# Patient Record
Sex: MALE | Race: WHITE | Employment: FULL TIME | URBAN - METROPOLITAN AREA
[De-identification: names, ages, dates, MRNs, and addresses within clinical notes are randomized per-mention and may not be internally consistent; named-entity substitution may affect disease eponyms.]

---

## 2017-05-04 ENCOUNTER — HOSPITAL ENCOUNTER (INPATIENT)
Age: 62
LOS: 1 days | Discharge: HOME | DRG: 103 | End: 2017-05-05
Attending: EMERGENCY MEDICINE | Admitting: FAMILY MEDICINE
Payer: COMMERCIAL

## 2017-05-04 ENCOUNTER — APPOINTMENT (OUTPATIENT)
Dept: CT IMAGING | Age: 62
DRG: 103 | End: 2017-05-04
Payer: COMMERCIAL

## 2017-05-04 ENCOUNTER — APPOINTMENT (OUTPATIENT)
Dept: MRI IMAGING | Age: 62
DRG: 103 | End: 2017-05-04
Payer: COMMERCIAL

## 2017-05-04 DIAGNOSIS — I63.9 CEREBROVASCULAR ACCIDENT (CVA), UNSPECIFIED MECHANISM (HCC): Primary | ICD-10-CM

## 2017-05-04 PROBLEM — R51.9 ACUTE HEADACHE: Status: ACTIVE | Noted: 2017-05-04

## 2017-05-04 PROBLEM — E78.5 DYSLIPIDEMIA: Status: ACTIVE | Noted: 2017-05-04

## 2017-05-04 PROBLEM — G43.809 MIGRAINE VARIANT: Status: ACTIVE | Noted: 2017-05-04

## 2017-05-04 PROBLEM — E78.1 HYPERTRIGLYCERIDEMIA: Status: ACTIVE | Noted: 2017-05-04

## 2017-05-04 PROBLEM — K21.9 GERD (GASTROESOPHAGEAL REFLUX DISEASE): Chronic | Status: ACTIVE | Noted: 2017-05-04

## 2017-05-04 PROBLEM — R20.0 LEFT SIDED NUMBNESS: Status: ACTIVE | Noted: 2017-05-04

## 2017-05-04 LAB
% CKMB: 3.5 % (ref 0–3.5)
ABSOLUTE EOS #: 0.2 K/UL (ref 0–0.4)
ABSOLUTE LYMPH #: 2.4 K/UL (ref 1–4.8)
ABSOLUTE MONO #: 0.5 K/UL (ref 0.1–1.2)
ANION GAP SERPL CALCULATED.3IONS-SCNC: 15 MMOL/L (ref 9–17)
BASOPHILS # BLD: 1 %
BASOPHILS ABSOLUTE: 0.1 K/UL (ref 0–0.2)
BUN BLDV-MCNC: 23 MG/DL (ref 8–23)
BUN/CREAT BLD: ABNORMAL (ref 9–20)
C-REACTIVE PROTEIN: 6.1 MG/L (ref 0–5)
CALCIUM SERPL-MCNC: 9.6 MG/DL (ref 8.6–10.4)
CHLORIDE BLD-SCNC: 95 MMOL/L (ref 98–107)
CK MB: 2.4 NG/ML
CKMB INTERPRETATION: ABNORMAL
CO2: 25 MMOL/L (ref 20–31)
CREAT SERPL-MCNC: 0.67 MG/DL (ref 0.7–1.2)
DIFFERENTIAL TYPE: ABNORMAL
EOSINOPHILS RELATIVE PERCENT: 3 %
ESTIMATED AVERAGE GLUCOSE: 203 MG/DL
GFR AFRICAN AMERICAN: >60 ML/MIN
GFR NON-AFRICAN AMERICAN: >60 ML/MIN
GFR SERPL CREATININE-BSD FRML MDRD: ABNORMAL ML/MIN/{1.73_M2}
GFR SERPL CREATININE-BSD FRML MDRD: ABNORMAL ML/MIN/{1.73_M2}
GLUCOSE BLD-MCNC: 131 MG/DL (ref 75–110)
GLUCOSE BLD-MCNC: 175 MG/DL (ref 75–110)
GLUCOSE BLD-MCNC: 179 MG/DL (ref 70–99)
GLUCOSE BLD-MCNC: 180 MG/DL (ref 75–110)
HBA1C MFR BLD: 8.7 % (ref 4–6)
HCT VFR BLD CALC: 44 % (ref 41–53)
HEMOGLOBIN: 14.8 G/DL (ref 13.5–17.5)
INR BLD: 0.9
LV EF: 50 %
LVEF MODALITY: NORMAL
LYMPHOCYTES # BLD: 35 %
MCH RBC QN AUTO: 26.8 PG (ref 26–34)
MCHC RBC AUTO-ENTMCNC: 33.5 G/DL (ref 31–37)
MCV RBC AUTO: 80 FL (ref 80–100)
MONOCYTES # BLD: 8 %
MYOGLOBIN: 24 NG/ML (ref 28–72)
PARTIAL THROMBOPLASTIN TIME: 23.7 SEC (ref 21.3–31.3)
PDW BLD-RTO: 16.4 % (ref 12.5–15.4)
PLATELET # BLD: 302 K/UL (ref 140–450)
PLATELET ESTIMATE: ABNORMAL
PMV BLD AUTO: 8 FL (ref 6–12)
POC CREATININE WHOLE BLOOD: 0.8
POC CREATININE: 0.8 MG/DL (ref 0.6–1.4)
POTASSIUM SERPL-SCNC: 4.9 MMOL/L (ref 3.7–5.3)
PROTHROMBIN TIME: 10.1 SEC (ref 9.4–12.6)
RBC # BLD: 5.51 M/UL (ref 4.5–5.9)
RBC # BLD: ABNORMAL 10*6/UL
SEDIMENTATION RATE, ERYTHROCYTE: 3 MM (ref 0–10)
SEG NEUTROPHILS: 53 %
SEGMENTED NEUTROPHILS ABSOLUTE COUNT: 3.7 K/UL (ref 1.8–7.7)
SODIUM BLD-SCNC: 135 MMOL/L (ref 135–144)
TOTAL CK: 68 U/L (ref 39–308)
TROPONIN INTERP: ABNORMAL
TROPONIN T: <0.03 NG/ML
WBC # BLD: 7 K/UL (ref 3.5–11)
WBC # BLD: ABNORMAL 10*3/UL

## 2017-05-04 PROCEDURE — G8989 SELF CARE D/C STATUS: HCPCS

## 2017-05-04 PROCEDURE — 82565 ASSAY OF CREATININE: CPT

## 2017-05-04 PROCEDURE — 70551 MRI BRAIN STEM W/O DYE: CPT

## 2017-05-04 PROCEDURE — 85025 COMPLETE CBC W/AUTO DIFF WBC: CPT

## 2017-05-04 PROCEDURE — 83874 ASSAY OF MYOGLOBIN: CPT

## 2017-05-04 PROCEDURE — G8987 SELF CARE CURRENT STATUS: HCPCS

## 2017-05-04 PROCEDURE — 82550 ASSAY OF CK (CPK): CPT

## 2017-05-04 PROCEDURE — 97161 PT EVAL LOW COMPLEX 20 MIN: CPT

## 2017-05-04 PROCEDURE — 94762 N-INVAS EAR/PLS OXIMTRY CONT: CPT

## 2017-05-04 PROCEDURE — 99254 IP/OBS CNSLTJ NEW/EST MOD 60: CPT | Performed by: PSYCHIATRY & NEUROLOGY

## 2017-05-04 PROCEDURE — 82947 ASSAY GLUCOSE BLOOD QUANT: CPT

## 2017-05-04 PROCEDURE — 93880 EXTRACRANIAL BILAT STUDY: CPT

## 2017-05-04 PROCEDURE — G8988 SELF CARE GOAL STATUS: HCPCS

## 2017-05-04 PROCEDURE — 84484 ASSAY OF TROPONIN QUANT: CPT

## 2017-05-04 PROCEDURE — 86140 C-REACTIVE PROTEIN: CPT

## 2017-05-04 PROCEDURE — 2060000000 HC ICU INTERMEDIATE R&B

## 2017-05-04 PROCEDURE — 83036 HEMOGLOBIN GLYCOSYLATED A1C: CPT

## 2017-05-04 PROCEDURE — 82553 CREATINE MB FRACTION: CPT

## 2017-05-04 PROCEDURE — 70544 MR ANGIOGRAPHY HEAD W/O DYE: CPT

## 2017-05-04 PROCEDURE — 70547 MR ANGIOGRAPHY NECK W/O DYE: CPT

## 2017-05-04 PROCEDURE — G8978 MOBILITY CURRENT STATUS: HCPCS

## 2017-05-04 PROCEDURE — 85610 PROTHROMBIN TIME: CPT

## 2017-05-04 PROCEDURE — 99223 1ST HOSP IP/OBS HIGH 75: CPT | Performed by: PSYCHIATRY & NEUROLOGY

## 2017-05-04 PROCEDURE — 97165 OT EVAL LOW COMPLEX 30 MIN: CPT

## 2017-05-04 PROCEDURE — 99223 1ST HOSP IP/OBS HIGH 75: CPT | Performed by: FAMILY MEDICINE

## 2017-05-04 PROCEDURE — 93306 TTE W/DOPPLER COMPLETE: CPT

## 2017-05-04 PROCEDURE — 85730 THROMBOPLASTIN TIME PARTIAL: CPT

## 2017-05-04 PROCEDURE — 97535 SELF CARE MNGMENT TRAINING: CPT

## 2017-05-04 PROCEDURE — 80048 BASIC METABOLIC PNL TOTAL CA: CPT

## 2017-05-04 PROCEDURE — 95819 EEG AWAKE AND ASLEEP: CPT

## 2017-05-04 PROCEDURE — 85651 RBC SED RATE NONAUTOMATED: CPT

## 2017-05-04 PROCEDURE — 2580000003 HC RX 258: Performed by: EMERGENCY MEDICINE

## 2017-05-04 PROCEDURE — 2580000003 HC RX 258: Performed by: NURSE PRACTITIONER

## 2017-05-04 PROCEDURE — 6360000002 HC RX W HCPCS: Performed by: NURSE PRACTITIONER

## 2017-05-04 PROCEDURE — G8979 MOBILITY GOAL STATUS: HCPCS

## 2017-05-04 PROCEDURE — 99285 EMERGENCY DEPT VISIT HI MDM: CPT

## 2017-05-04 RX ORDER — FENOFIBRATE 54 MG/1
54 TABLET ORAL DAILY
Status: DISCONTINUED | OUTPATIENT
Start: 2017-05-04 | End: 2017-05-05 | Stop reason: HOSPADM

## 2017-05-04 RX ORDER — BUTALBITAL, ACETAMINOPHEN AND CAFFEINE 50; 325; 40 MG/1; MG/1; MG/1
1 TABLET ORAL EVERY 6 HOURS PRN
Status: DISCONTINUED | OUTPATIENT
Start: 2017-05-04 | End: 2017-05-05 | Stop reason: HOSPADM

## 2017-05-04 RX ORDER — GLIMEPIRIDE 2 MG/1
4 TABLET ORAL 2 TIMES DAILY
Status: DISCONTINUED | OUTPATIENT
Start: 2017-05-04 | End: 2017-05-04

## 2017-05-04 RX ORDER — HYDROCHLOROTHIAZIDE 25 MG/1
12.5 TABLET ORAL DAILY
Status: DISCONTINUED | OUTPATIENT
Start: 2017-05-04 | End: 2017-05-05 | Stop reason: HOSPADM

## 2017-05-04 RX ORDER — NICOTINE POLACRILEX 4 MG
15 LOZENGE BUCCAL PRN
Status: DISCONTINUED | OUTPATIENT
Start: 2017-05-04 | End: 2017-05-05 | Stop reason: HOSPADM

## 2017-05-04 RX ORDER — SODIUM CHLORIDE 0.9 % (FLUSH) 0.9 %
10 SYRINGE (ML) INJECTION EVERY 12 HOURS SCHEDULED
Status: DISCONTINUED | OUTPATIENT
Start: 2017-05-04 | End: 2017-05-05 | Stop reason: HOSPADM

## 2017-05-04 RX ORDER — TRIMETHOPRIM 100 MG/1
100 TABLET ORAL DAILY
Status: DISCONTINUED | OUTPATIENT
Start: 2017-05-04 | End: 2017-05-04

## 2017-05-04 RX ORDER — GLIMEPIRIDE 2 MG/1
4 TABLET ORAL
Status: DISCONTINUED | OUTPATIENT
Start: 2017-05-05 | End: 2017-05-05 | Stop reason: HOSPADM

## 2017-05-04 RX ORDER — ACETAMINOPHEN 325 MG/1
650 TABLET ORAL EVERY 4 HOURS PRN
Status: DISCONTINUED | OUTPATIENT
Start: 2017-05-04 | End: 2017-05-05 | Stop reason: HOSPADM

## 2017-05-04 RX ORDER — HYDROCHLOROTHIAZIDE 25 MG/1
12.5 TABLET ORAL 2 TIMES DAILY
Status: DISCONTINUED | OUTPATIENT
Start: 2017-05-04 | End: 2017-05-04

## 2017-05-04 RX ORDER — 0.9 % SODIUM CHLORIDE 0.9 %
1000 INTRAVENOUS SOLUTION INTRAVENOUS ONCE
Status: COMPLETED | OUTPATIENT
Start: 2017-05-04 | End: 2017-05-04

## 2017-05-04 RX ORDER — MORPHINE SULFATE 2 MG/ML
2 INJECTION, SOLUTION INTRAMUSCULAR; INTRAVENOUS
Status: DISCONTINUED | OUTPATIENT
Start: 2017-05-04 | End: 2017-05-05 | Stop reason: HOSPADM

## 2017-05-04 RX ORDER — LISINOPRIL AND HYDROCHLOROTHIAZIDE 20; 12.5 MG/1; MG/1
1 TABLET ORAL DAILY
COMMUNITY
End: 2017-06-26 | Stop reason: SDUPTHER

## 2017-05-04 RX ORDER — HYDROCODONE BITARTRATE AND ACETAMINOPHEN 5; 325 MG/1; MG/1
1 TABLET ORAL EVERY 4 HOURS PRN
Status: DISCONTINUED | OUTPATIENT
Start: 2017-05-04 | End: 2017-05-05 | Stop reason: HOSPADM

## 2017-05-04 RX ORDER — M-VIT,TX,IRON,MINS/CALC/FOLIC 27MG-0.4MG
1 TABLET ORAL DAILY
Status: DISCONTINUED | OUTPATIENT
Start: 2017-05-04 | End: 2017-05-05 | Stop reason: HOSPADM

## 2017-05-04 RX ORDER — LISINOPRIL 20 MG/1
20 TABLET ORAL DAILY
Status: DISCONTINUED | OUTPATIENT
Start: 2017-05-04 | End: 2017-05-05 | Stop reason: HOSPADM

## 2017-05-04 RX ORDER — MORPHINE SULFATE 4 MG/ML
4 INJECTION, SOLUTION INTRAMUSCULAR; INTRAVENOUS
Status: DISCONTINUED | OUTPATIENT
Start: 2017-05-04 | End: 2017-05-05 | Stop reason: HOSPADM

## 2017-05-04 RX ORDER — HYDROCHLOROTHIAZIDE 25 MG/1
25 TABLET ORAL 2 TIMES DAILY
Status: DISCONTINUED | OUTPATIENT
Start: 2017-05-04 | End: 2017-05-04

## 2017-05-04 RX ORDER — ATORVASTATIN CALCIUM 80 MG/1
80 TABLET, FILM COATED ORAL DAILY
Status: DISCONTINUED | OUTPATIENT
Start: 2017-05-04 | End: 2017-05-05 | Stop reason: HOSPADM

## 2017-05-04 RX ORDER — PANTOPRAZOLE SODIUM 40 MG/1
40 TABLET, DELAYED RELEASE ORAL
Status: DISCONTINUED | OUTPATIENT
Start: 2017-05-05 | End: 2017-05-05 | Stop reason: HOSPADM

## 2017-05-04 RX ORDER — LISINOPRIL AND HYDROCHLOROTHIAZIDE 25; 20 MG/1; MG/1
1 TABLET ORAL 2 TIMES DAILY
Status: DISCONTINUED | OUTPATIENT
Start: 2017-05-04 | End: 2017-05-04

## 2017-05-04 RX ORDER — LISINOPRIL 20 MG/1
20 TABLET ORAL 2 TIMES DAILY
Status: DISCONTINUED | OUTPATIENT
Start: 2017-05-04 | End: 2017-05-04

## 2017-05-04 RX ORDER — BISACODYL 10 MG
10 SUPPOSITORY, RECTAL RECTAL DAILY PRN
Status: DISCONTINUED | OUTPATIENT
Start: 2017-05-04 | End: 2017-05-05 | Stop reason: HOSPADM

## 2017-05-04 RX ORDER — CLOPIDOGREL BISULFATE 75 MG/1
75 TABLET ORAL DAILY
Status: DISCONTINUED | OUTPATIENT
Start: 2017-05-04 | End: 2017-05-04

## 2017-05-04 RX ORDER — SODIUM CHLORIDE 0.9 % (FLUSH) 0.9 %
10 SYRINGE (ML) INJECTION PRN
Status: DISCONTINUED | OUTPATIENT
Start: 2017-05-04 | End: 2017-05-05 | Stop reason: HOSPADM

## 2017-05-04 RX ORDER — ASPIRIN 81 MG/1
81 TABLET ORAL DAILY
Status: DISCONTINUED | OUTPATIENT
Start: 2017-05-04 | End: 2017-05-05 | Stop reason: HOSPADM

## 2017-05-04 RX ORDER — TAMSULOSIN HYDROCHLORIDE 0.4 MG/1
0.4 CAPSULE ORAL DAILY
Status: DISCONTINUED | OUTPATIENT
Start: 2017-05-04 | End: 2017-05-04

## 2017-05-04 RX ORDER — ONDANSETRON 2 MG/ML
4 INJECTION INTRAMUSCULAR; INTRAVENOUS EVERY 6 HOURS PRN
Status: DISCONTINUED | OUTPATIENT
Start: 2017-05-04 | End: 2017-05-05 | Stop reason: HOSPADM

## 2017-05-04 RX ORDER — DEXTROSE MONOHYDRATE 25 G/50ML
12.5 INJECTION, SOLUTION INTRAVENOUS PRN
Status: DISCONTINUED | OUTPATIENT
Start: 2017-05-04 | End: 2017-05-05 | Stop reason: HOSPADM

## 2017-05-04 RX ORDER — DEXTROSE MONOHYDRATE 50 MG/ML
100 INJECTION, SOLUTION INTRAVENOUS PRN
Status: DISCONTINUED | OUTPATIENT
Start: 2017-05-04 | End: 2017-05-05 | Stop reason: HOSPADM

## 2017-05-04 RX ORDER — SODIUM CHLORIDE 9 MG/ML
INJECTION, SOLUTION INTRAVENOUS CONTINUOUS
Status: DISCONTINUED | OUTPATIENT
Start: 2017-05-04 | End: 2017-05-04

## 2017-05-04 RX ADMIN — Medication 1 TABLET: at 14:17

## 2017-05-04 RX ADMIN — LISINOPRIL 20 MG: 20 TABLET ORAL at 14:15

## 2017-05-04 RX ADMIN — SODIUM CHLORIDE, PRESERVATIVE FREE 10 ML: 5 INJECTION INTRAVENOUS at 19:58

## 2017-05-04 RX ADMIN — ATORVASTATIN CALCIUM 80 MG: 80 TABLET, FILM COATED ORAL at 14:18

## 2017-05-04 RX ADMIN — SODIUM CHLORIDE 1000 ML: 9 INJECTION, SOLUTION INTRAVENOUS at 05:48

## 2017-05-04 RX ADMIN — SODIUM CHLORIDE: 9 INJECTION, SOLUTION INTRAVENOUS at 06:41

## 2017-05-04 RX ADMIN — ENOXAPARIN SODIUM 40 MG: 40 INJECTION SUBCUTANEOUS at 14:17

## 2017-05-04 RX ADMIN — HYDROCHLOROTHIAZIDE 12.5 MG: 25 TABLET ORAL at 14:15

## 2017-05-04 RX ADMIN — ASPIRIN 81 MG: 81 TABLET ORAL at 14:19

## 2017-05-04 ASSESSMENT — ENCOUNTER SYMPTOMS
SORE THROAT: 0
SINUS PRESSURE: 0
SHORTNESS OF BREATH: 0
EYE DISCHARGE: 0
STRIDOR: 0
EYE REDNESS: 0
CHEST TIGHTNESS: 0
NAUSEA: 0
ABDOMINAL PAIN: 0
COUGH: 0
CONSTIPATION: 0
VOMITING: 0
EYE PAIN: 0
BLOOD IN STOOL: 0
WHEEZING: 0
DIARRHEA: 0
RHINORRHEA: 0
RECTAL PAIN: 0
BACK PAIN: 0

## 2017-05-04 ASSESSMENT — PAIN SCALES - GENERAL
PAINLEVEL_OUTOF10: 0
PAINLEVEL_OUTOF10: 0

## 2017-05-05 VITALS
TEMPERATURE: 96.9 F | HEIGHT: 72 IN | DIASTOLIC BLOOD PRESSURE: 73 MMHG | HEART RATE: 78 BPM | RESPIRATION RATE: 16 BRPM | BODY MASS INDEX: 24.79 KG/M2 | WEIGHT: 183 LBS | SYSTOLIC BLOOD PRESSURE: 130 MMHG | OXYGEN SATURATION: 97 %

## 2017-05-05 PROBLEM — I63.9 STROKE DETERMINED BY CLINICAL ASSESSMENT (HCC): Status: RESOLVED | Noted: 2017-05-04 | Resolved: 2017-05-05

## 2017-05-05 LAB
ALBUMIN SERPL-MCNC: 4.3 G/DL (ref 3.5–5.2)
ALBUMIN/GLOBULIN RATIO: 1.2 (ref 1–2.5)
ALP BLD-CCNC: 61 U/L (ref 40–129)
ALT SERPL-CCNC: 17 U/L (ref 5–41)
ANION GAP SERPL CALCULATED.3IONS-SCNC: 17 MMOL/L (ref 9–17)
AST SERPL-CCNC: 12 U/L
BILIRUB SERPL-MCNC: 0.56 MG/DL (ref 0.3–1.2)
BUN BLDV-MCNC: 28 MG/DL (ref 8–23)
BUN/CREAT BLD: ABNORMAL (ref 9–20)
CALCIUM SERPL-MCNC: 10 MG/DL (ref 8.6–10.4)
CHLORIDE BLD-SCNC: 98 MMOL/L (ref 98–107)
CHOLESTEROL/HDL RATIO: 8.7
CHOLESTEROL: 339 MG/DL
CO2: 21 MMOL/L (ref 20–31)
CREAT SERPL-MCNC: 0.7 MG/DL (ref 0.7–1.2)
ESTIMATED AVERAGE GLUCOSE: 206 MG/DL
GFR AFRICAN AMERICAN: >60 ML/MIN
GFR NON-AFRICAN AMERICAN: >60 ML/MIN
GFR SERPL CREATININE-BSD FRML MDRD: ABNORMAL ML/MIN/{1.73_M2}
GFR SERPL CREATININE-BSD FRML MDRD: ABNORMAL ML/MIN/{1.73_M2}
GLUCOSE BLD-MCNC: 211 MG/DL (ref 70–99)
HBA1C MFR BLD: 8.8 % (ref 4–6)
HCT VFR BLD CALC: 48 % (ref 41–53)
HDLC SERPL-MCNC: 39 MG/DL
HEMOGLOBIN: 15.9 G/DL (ref 13.5–17.5)
LDL CHOLESTEROL DIRECT: 206 MG/DL
LDL CHOLESTEROL: ABNORMAL MG/DL (ref 0–130)
MCH RBC QN AUTO: 26.6 PG (ref 26–34)
MCHC RBC AUTO-ENTMCNC: 33.1 G/DL (ref 31–37)
MCV RBC AUTO: 80.4 FL (ref 80–100)
PDW BLD-RTO: 15.9 % (ref 12.5–15.4)
PLATELET # BLD: 317 K/UL (ref 140–450)
PMV BLD AUTO: 8.4 FL (ref 6–12)
POTASSIUM SERPL-SCNC: 4.4 MMOL/L (ref 3.7–5.3)
RBC # BLD: 5.97 M/UL (ref 4.5–5.9)
SODIUM BLD-SCNC: 136 MMOL/L (ref 135–144)
TOTAL PROTEIN: 7.8 G/DL (ref 6.4–8.3)
TRIGL SERPL-MCNC: 761 MG/DL
VLDLC SERPL CALC-MCNC: ABNORMAL MG/DL (ref 1–30)
WBC # BLD: 9.4 K/UL (ref 3.5–11)

## 2017-05-05 PROCEDURE — 83721 ASSAY OF BLOOD LIPOPROTEIN: CPT

## 2017-05-05 PROCEDURE — 6370000000 HC RX 637 (ALT 250 FOR IP): Performed by: NURSE PRACTITIONER

## 2017-05-05 PROCEDURE — 80053 COMPREHEN METABOLIC PANEL: CPT

## 2017-05-05 PROCEDURE — 99233 SBSQ HOSP IP/OBS HIGH 50: CPT | Performed by: FAMILY MEDICINE

## 2017-05-05 PROCEDURE — 97116 GAIT TRAINING THERAPY: CPT

## 2017-05-05 PROCEDURE — 80061 LIPID PANEL: CPT

## 2017-05-05 PROCEDURE — 97110 THERAPEUTIC EXERCISES: CPT

## 2017-05-05 PROCEDURE — 99232 SBSQ HOSP IP/OBS MODERATE 35: CPT | Performed by: PSYCHIATRY & NEUROLOGY

## 2017-05-05 PROCEDURE — 83036 HEMOGLOBIN GLYCOSYLATED A1C: CPT

## 2017-05-05 PROCEDURE — 85027 COMPLETE CBC AUTOMATED: CPT

## 2017-05-05 PROCEDURE — 36415 COLL VENOUS BLD VENIPUNCTURE: CPT

## 2017-05-05 PROCEDURE — 97530 THERAPEUTIC ACTIVITIES: CPT

## 2017-05-05 PROCEDURE — 94762 N-INVAS EAR/PLS OXIMTRY CONT: CPT

## 2017-05-05 RX ORDER — FENOFIBRATE 160 MG/1
160 TABLET ORAL DAILY
Qty: 30 TABLET | Refills: 6 | Status: SHIPPED | OUTPATIENT
Start: 2017-05-05 | End: 2017-06-26 | Stop reason: SDUPTHER

## 2017-05-05 RX ADMIN — GLIMEPIRIDE 4 MG: 2 TABLET ORAL at 08:47

## 2017-05-05 RX ADMIN — ACETAMINOPHEN 650 MG: 325 TABLET ORAL at 10:19

## 2017-05-05 RX ADMIN — ATORVASTATIN CALCIUM 80 MG: 80 TABLET, FILM COATED ORAL at 08:46

## 2017-05-05 RX ADMIN — Medication 1 TABLET: at 08:46

## 2017-05-05 RX ADMIN — LISINOPRIL 20 MG: 20 TABLET ORAL at 08:47

## 2017-05-05 RX ADMIN — HYDROCHLOROTHIAZIDE 12.5 MG: 25 TABLET ORAL at 08:47

## 2017-05-05 RX ADMIN — ASPIRIN 81 MG: 81 TABLET ORAL at 08:48

## 2017-05-05 ASSESSMENT — ENCOUNTER SYMPTOMS
SHORTNESS OF BREATH: 0
DIARRHEA: 0
VOMITING: 0
CONSTIPATION: 0
ABDOMINAL PAIN: 0
NAUSEA: 0
WHEEZING: 0

## 2017-05-05 ASSESSMENT — PAIN SCALES - GENERAL
PAINLEVEL_OUTOF10: 0
PAINLEVEL_OUTOF10: 1

## 2017-05-15 ENCOUNTER — OFFICE VISIT (OUTPATIENT)
Dept: FAMILY MEDICINE CLINIC | Age: 62
End: 2017-05-15
Payer: COMMERCIAL

## 2017-05-15 VITALS
HEART RATE: 72 BPM | BODY MASS INDEX: 25.36 KG/M2 | SYSTOLIC BLOOD PRESSURE: 120 MMHG | WEIGHT: 187 LBS | DIASTOLIC BLOOD PRESSURE: 70 MMHG

## 2017-05-15 DIAGNOSIS — I10 ESSENTIAL HYPERTENSION: Chronic | ICD-10-CM

## 2017-05-15 DIAGNOSIS — E11.65 TYPE 2 DIABETES MELLITUS WITH HYPERGLYCEMIA, WITHOUT LONG-TERM CURRENT USE OF INSULIN (HCC): Chronic | ICD-10-CM

## 2017-05-15 DIAGNOSIS — E78.5 DYSLIPIDEMIA: ICD-10-CM

## 2017-05-15 DIAGNOSIS — G43.809 MIGRAINE VARIANT: Primary | ICD-10-CM

## 2017-05-15 PROCEDURE — 99215 OFFICE O/P EST HI 40 MIN: CPT | Performed by: FAMILY MEDICINE

## 2017-05-15 RX ORDER — CALCIUM CARBONATE 200(500)MG
1 TABLET,CHEWABLE ORAL DAILY
COMMUNITY

## 2017-05-15 RX ORDER — SIMETHICONE 125 MG
CAPSULE ORAL
COMMUNITY
End: 2019-12-20 | Stop reason: CLARIF

## 2017-05-15 RX ORDER — LORATADINE 10 MG/1
10 TABLET ORAL DAILY
COMMUNITY

## 2017-05-15 ASSESSMENT — ENCOUNTER SYMPTOMS: SHORTNESS OF BREATH: 0

## 2017-06-02 ENCOUNTER — OFFICE VISIT (OUTPATIENT)
Dept: NEUROLOGY | Age: 62
End: 2017-06-02
Payer: COMMERCIAL

## 2017-06-02 VITALS
HEART RATE: 65 BPM | HEIGHT: 70 IN | BODY MASS INDEX: 27 KG/M2 | WEIGHT: 188.6 LBS | SYSTOLIC BLOOD PRESSURE: 139 MMHG | DIASTOLIC BLOOD PRESSURE: 77 MMHG

## 2017-06-02 DIAGNOSIS — G43.809 MIGRAINE VARIANT: Primary | ICD-10-CM

## 2017-06-02 PROCEDURE — 99214 OFFICE O/P EST MOD 30 MIN: CPT | Performed by: PSYCHIATRY & NEUROLOGY

## 2017-06-26 ENCOUNTER — OFFICE VISIT (OUTPATIENT)
Dept: FAMILY MEDICINE CLINIC | Age: 62
End: 2017-06-26
Payer: COMMERCIAL

## 2017-06-26 VITALS
BODY MASS INDEX: 26.4 KG/M2 | HEART RATE: 60 BPM | WEIGHT: 184 LBS | SYSTOLIC BLOOD PRESSURE: 140 MMHG | DIASTOLIC BLOOD PRESSURE: 80 MMHG

## 2017-06-26 DIAGNOSIS — I10 ESSENTIAL HYPERTENSION: Chronic | ICD-10-CM

## 2017-06-26 DIAGNOSIS — E11.65 TYPE 2 DIABETES MELLITUS WITH HYPERGLYCEMIA, UNSPECIFIED LONG TERM INSULIN USE STATUS: ICD-10-CM

## 2017-06-26 DIAGNOSIS — Z11.59 NEED FOR HEPATITIS C SCREENING TEST: ICD-10-CM

## 2017-06-26 DIAGNOSIS — G43.809 MIGRAINE VARIANT: ICD-10-CM

## 2017-06-26 DIAGNOSIS — E78.2 MIXED HYPERLIPIDEMIA: Primary | ICD-10-CM

## 2017-06-26 LAB — HBA1C MFR BLD: 7.3 %

## 2017-06-26 PROCEDURE — 83036 HEMOGLOBIN GLYCOSYLATED A1C: CPT | Performed by: FAMILY MEDICINE

## 2017-06-26 PROCEDURE — 99214 OFFICE O/P EST MOD 30 MIN: CPT | Performed by: FAMILY MEDICINE

## 2017-06-26 RX ORDER — FENOFIBRATE 160 MG/1
160 TABLET ORAL DAILY
Qty: 30 TABLET | Refills: 6 | Status: SHIPPED | OUTPATIENT
Start: 2017-06-26 | End: 2019-12-20 | Stop reason: CLARIF

## 2017-06-26 RX ORDER — GLIMEPIRIDE 4 MG/1
4 TABLET ORAL
Qty: 30 TABLET | Refills: 5 | Status: SHIPPED | OUTPATIENT
Start: 2017-06-26 | End: 2019-12-20 | Stop reason: CLARIF

## 2017-06-26 RX ORDER — ATORVASTATIN CALCIUM 80 MG/1
80 TABLET, FILM COATED ORAL DAILY
Qty: 30 TABLET | Refills: 5 | Status: SHIPPED | OUTPATIENT
Start: 2017-06-26

## 2017-06-26 RX ORDER — LISINOPRIL AND HYDROCHLOROTHIAZIDE 20; 12.5 MG/1; MG/1
1 TABLET ORAL DAILY
Qty: 30 TABLET | Refills: 5 | Status: SHIPPED | OUTPATIENT
Start: 2017-06-26 | End: 2019-12-20

## 2017-06-26 ASSESSMENT — ENCOUNTER SYMPTOMS
BACK PAIN: 1
SHORTNESS OF BREATH: 0

## 2017-06-26 ASSESSMENT — PATIENT HEALTH QUESTIONNAIRE - PHQ9
SUM OF ALL RESPONSES TO PHQ QUESTIONS 1-9: 0
2. FEELING DOWN, DEPRESSED OR HOPELESS: 0
SUM OF ALL RESPONSES TO PHQ9 QUESTIONS 1 & 2: 0
1. LITTLE INTEREST OR PLEASURE IN DOING THINGS: 0

## 2018-08-01 ENCOUNTER — HOSPITAL ENCOUNTER (INPATIENT)
Age: 63
LOS: 1 days | Discharge: HOME | DRG: 103 | End: 2018-08-02
Attending: EMERGENCY MEDICINE | Admitting: PSYCHIATRY & NEUROLOGY
Payer: COMMERCIAL

## 2018-08-01 ENCOUNTER — APPOINTMENT (OUTPATIENT)
Dept: MRI IMAGING | Age: 63
DRG: 103 | End: 2018-08-01
Payer: COMMERCIAL

## 2018-08-01 DIAGNOSIS — R53.1 LEFT-SIDED WEAKNESS: Primary | ICD-10-CM

## 2018-08-01 DIAGNOSIS — I10 ESSENTIAL HYPERTENSION: Chronic | ICD-10-CM

## 2018-08-01 DIAGNOSIS — G43.809 MIGRAINE VARIANT: ICD-10-CM

## 2018-08-01 PROBLEM — G43.109 COMPLICATED MIGRAINE: Status: ACTIVE | Noted: 2018-08-01

## 2018-08-01 LAB
% CKMB: 3.1 % (ref 0–3.5)
ABSOLUTE EOS #: 0.09 K/UL (ref 0–0.44)
ABSOLUTE IMMATURE GRANULOCYTE: 0.06 K/UL (ref 0–0.3)
ABSOLUTE LYMPH #: 1.61 K/UL (ref 1.1–3.7)
ABSOLUTE MONO #: 0.26 K/UL (ref 0.1–1.2)
ALLEN TEST: ABNORMAL
ANION GAP SERPL CALCULATED.3IONS-SCNC: 13 MMOL/L (ref 9–17)
ANION GAP: 10 MMOL/L (ref 7–16)
BASOPHILS # BLD: 1 % (ref 0–2)
BASOPHILS ABSOLUTE: 0.05 K/UL (ref 0–0.2)
BUN BLDV-MCNC: 22 MG/DL (ref 8–23)
BUN/CREAT BLD: ABNORMAL (ref 9–20)
CALCIUM SERPL-MCNC: 9.4 MG/DL (ref 8.6–10.4)
CHLORIDE BLD-SCNC: 103 MMOL/L (ref 98–107)
CHOLESTEROL/HDL RATIO: 4.5
CHOLESTEROL: 196 MG/DL
CK MB: 2.2 NG/ML
CKMB INTERPRETATION: ABNORMAL
CO2: 23 MMOL/L (ref 20–31)
CREAT SERPL-MCNC: 0.7 MG/DL (ref 0.7–1.2)
DIFFERENTIAL TYPE: ABNORMAL
EOSINOPHILS RELATIVE PERCENT: 1 % (ref 1–4)
FIO2: ABNORMAL
GFR AFRICAN AMERICAN: >60 ML/MIN
GFR NON-AFRICAN AMERICAN: >60 ML/MIN
GFR NON-AFRICAN AMERICAN: >60 ML/MIN
GFR SERPL CREATININE-BSD FRML MDRD: >60 ML/MIN
GFR SERPL CREATININE-BSD FRML MDRD: ABNORMAL ML/MIN/{1.73_M2}
GFR SERPL CREATININE-BSD FRML MDRD: ABNORMAL ML/MIN/{1.73_M2}
GFR SERPL CREATININE-BSD FRML MDRD: NORMAL ML/MIN/{1.73_M2}
GLUCOSE BLD-MCNC: 178 MG/DL (ref 70–99)
GLUCOSE BLD-MCNC: 184 MG/DL (ref 74–100)
HCO3 VENOUS: 25.3 MMOL/L (ref 22–29)
HCT VFR BLD CALC: 44 % (ref 40.7–50.3)
HDLC SERPL-MCNC: 44 MG/DL
HEMOGLOBIN: 13.7 G/DL (ref 13–17)
HOMOCYSTEINE: 8.2 UMOL/L
IMMATURE GRANULOCYTES: 1 %
INR BLD: 0.9
LDL CHOLESTEROL: 133 MG/DL (ref 0–130)
LYMPHOCYTES # BLD: 17 % (ref 24–43)
MCH RBC QN AUTO: 26.2 PG (ref 25.2–33.5)
MCHC RBC AUTO-ENTMCNC: 31.1 G/DL (ref 28.4–34.8)
MCV RBC AUTO: 84.1 FL (ref 82.6–102.9)
MODE: ABNORMAL
MONOCYTES # BLD: 3 % (ref 3–12)
MYOGLOBIN: <21 NG/ML (ref 28–72)
NEGATIVE BASE EXCESS, VEN: ABNORMAL (ref 0–2)
NRBC AUTOMATED: 0 PER 100 WBC
O2 DEVICE/FLOW/%: ABNORMAL
O2 SAT, VEN: 80 % (ref 60–85)
PARTIAL THROMBOPLASTIN TIME: 24.2 SEC (ref 20.5–30.5)
PATIENT TEMP: ABNORMAL
PCO2, VEN: 40 MM HG (ref 41–51)
PDW BLD-RTO: 14.2 % (ref 11.8–14.4)
PH VENOUS: 7.41 (ref 7.32–7.43)
PLATELET # BLD: 290 K/UL (ref 138–453)
PLATELET ESTIMATE: ABNORMAL
PMV BLD AUTO: 10.5 FL (ref 8.1–13.5)
PO2, VEN: 44.3 MM HG (ref 30–50)
POC CHLORIDE: 107 MMOL/L (ref 98–107)
POC CREATININE: 0.81 MG/DL (ref 0.51–1.19)
POC HEMATOCRIT: 46 % (ref 41–53)
POC HEMOGLOBIN: 15.5 G/DL (ref 13.5–17.5)
POC IONIZED CALCIUM: 1.22 MMOL/L (ref 1.15–1.33)
POC LACTIC ACID: 1.08 MMOL/L (ref 0.56–1.39)
POC PCO2 TEMP: ABNORMAL MM HG
POC PH TEMP: ABNORMAL
POC PO2 TEMP: ABNORMAL MM HG
POC POTASSIUM: 4.1 MMOL/L (ref 3.5–4.5)
POC SODIUM: 142 MMOL/L (ref 138–146)
POC TROPONIN I: 0 NG/ML (ref 0–0.1)
POC TROPONIN INTERP: NORMAL
POSITIVE BASE EXCESS, VEN: 1 (ref 0–3)
POTASSIUM SERPL-SCNC: 4.2 MMOL/L (ref 3.7–5.3)
PROTHROMBIN TIME: 9.8 SEC (ref 9–12)
RBC # BLD: 5.23 M/UL (ref 4.21–5.77)
RBC # BLD: ABNORMAL 10*6/UL
SAMPLE SITE: ABNORMAL
SEG NEUTROPHILS: 77 % (ref 36–65)
SEGMENTED NEUTROPHILS ABSOLUTE COUNT: 7.27 K/UL (ref 1.5–8.1)
SODIUM BLD-SCNC: 139 MMOL/L (ref 135–144)
TOTAL CK: 71 U/L (ref 39–308)
TOTAL CO2, VENOUS: 27 MMOL/L (ref 23–30)
TRIGL SERPL-MCNC: 96 MG/DL
TROPONIN INTERP: ABNORMAL
TROPONIN T: <0.03 NG/ML
VLDLC SERPL CALC-MCNC: ABNORMAL MG/DL (ref 1–30)
WBC # BLD: 9.3 K/UL (ref 3.5–11.3)
WBC # BLD: ABNORMAL 10*3/UL

## 2018-08-01 PROCEDURE — 6370000000 HC RX 637 (ALT 250 FOR IP): Performed by: NURSE PRACTITIONER

## 2018-08-01 PROCEDURE — 82565 ASSAY OF CREATININE: CPT

## 2018-08-01 PROCEDURE — 85730 THROMBOPLASTIN TIME PARTIAL: CPT

## 2018-08-01 PROCEDURE — 85300 ANTITHROMBIN III ACTIVITY: CPT

## 2018-08-01 PROCEDURE — 99222 1ST HOSP IP/OBS MODERATE 55: CPT | Performed by: PSYCHIATRY & NEUROLOGY

## 2018-08-01 PROCEDURE — 83036 HEMOGLOBIN GLYCOSYLATED A1C: CPT

## 2018-08-01 PROCEDURE — 82330 ASSAY OF CALCIUM: CPT

## 2018-08-01 PROCEDURE — 85305 CLOT INHIBIT PROT S TOTAL: CPT

## 2018-08-01 PROCEDURE — 82947 ASSAY GLUCOSE BLOOD QUANT: CPT

## 2018-08-01 PROCEDURE — 82553 CREATINE MB FRACTION: CPT

## 2018-08-01 PROCEDURE — 80061 LIPID PANEL: CPT

## 2018-08-01 PROCEDURE — 84484 ASSAY OF TROPONIN QUANT: CPT

## 2018-08-01 PROCEDURE — 82435 ASSAY OF BLOOD CHLORIDE: CPT

## 2018-08-01 PROCEDURE — 85613 RUSSELL VIPER VENOM DILUTED: CPT

## 2018-08-01 PROCEDURE — 84295 ASSAY OF SERUM SODIUM: CPT

## 2018-08-01 PROCEDURE — 2580000003 HC RX 258: Performed by: NURSE PRACTITIONER

## 2018-08-01 PROCEDURE — 80048 BASIC METABOLIC PNL TOTAL CA: CPT

## 2018-08-01 PROCEDURE — 70551 MRI BRAIN STEM W/O DYE: CPT

## 2018-08-01 PROCEDURE — 81241 F5 GENE: CPT

## 2018-08-01 PROCEDURE — 85302 CLOT INHIBIT PROT C ANTIGEN: CPT

## 2018-08-01 PROCEDURE — 82550 ASSAY OF CK (CPK): CPT

## 2018-08-01 PROCEDURE — 85014 HEMATOCRIT: CPT

## 2018-08-01 PROCEDURE — 85610 PROTHROMBIN TIME: CPT

## 2018-08-01 PROCEDURE — 6360000002 HC RX W HCPCS: Performed by: NURSE PRACTITIONER

## 2018-08-01 PROCEDURE — 36415 COLL VENOUS BLD VENIPUNCTURE: CPT

## 2018-08-01 PROCEDURE — 93005 ELECTROCARDIOGRAM TRACING: CPT

## 2018-08-01 PROCEDURE — 99223 1ST HOSP IP/OBS HIGH 75: CPT | Performed by: PSYCHIATRY & NEUROLOGY

## 2018-08-01 PROCEDURE — 82803 BLOOD GASES ANY COMBINATION: CPT

## 2018-08-01 PROCEDURE — 6370000000 HC RX 637 (ALT 250 FOR IP): Performed by: FAMILY MEDICINE

## 2018-08-01 PROCEDURE — 2060000000 HC ICU INTERMEDIATE R&B

## 2018-08-01 PROCEDURE — 83090 ASSAY OF HOMOCYSTEINE: CPT

## 2018-08-01 PROCEDURE — 99285 EMERGENCY DEPT VISIT HI MDM: CPT

## 2018-08-01 PROCEDURE — 81240 F2 GENE: CPT

## 2018-08-01 PROCEDURE — 85025 COMPLETE CBC W/AUTO DIFF WBC: CPT

## 2018-08-01 PROCEDURE — 86147 CARDIOLIPIN ANTIBODY EA IG: CPT

## 2018-08-01 PROCEDURE — 83874 ASSAY OF MYOGLOBIN: CPT

## 2018-08-01 PROCEDURE — 83605 ASSAY OF LACTIC ACID: CPT

## 2018-08-01 PROCEDURE — 84132 ASSAY OF SERUM POTASSIUM: CPT

## 2018-08-01 PROCEDURE — 2580000003 HC RX 258: Performed by: FAMILY MEDICINE

## 2018-08-01 PROCEDURE — 85307 ASSAY ACTIVATED PROTEIN C: CPT

## 2018-08-01 RX ORDER — SODIUM CHLORIDE 0.9 % (FLUSH) 0.9 %
10 SYRINGE (ML) INJECTION PRN
Status: DISCONTINUED | OUTPATIENT
Start: 2018-08-01 | End: 2018-08-02 | Stop reason: SDUPTHER

## 2018-08-01 RX ORDER — FENOFIBRATE 160 MG/1
160 TABLET ORAL DAILY
Status: DISCONTINUED | OUTPATIENT
Start: 2018-08-01 | End: 2018-08-02 | Stop reason: HOSPADM

## 2018-08-01 RX ORDER — SODIUM CHLORIDE 0.9 % (FLUSH) 0.9 %
10 SYRINGE (ML) INJECTION PRN
Status: DISCONTINUED | OUTPATIENT
Start: 2018-08-01 | End: 2018-08-02 | Stop reason: HOSPADM

## 2018-08-01 RX ORDER — ASPIRIN 81 MG/1
81 TABLET ORAL DAILY
Status: DISCONTINUED | OUTPATIENT
Start: 2018-08-01 | End: 2018-08-02 | Stop reason: HOSPADM

## 2018-08-01 RX ORDER — BUTALBITAL, ACETAMINOPHEN AND CAFFEINE 50; 325; 40 MG/1; MG/1; MG/1
1 TABLET ORAL EVERY 6 HOURS PRN
Status: DISCONTINUED | OUTPATIENT
Start: 2018-08-01 | End: 2018-08-02 | Stop reason: HOSPADM

## 2018-08-01 RX ORDER — ASPIRIN 81 MG/1
81 TABLET ORAL DAILY
Status: DISCONTINUED | OUTPATIENT
Start: 2018-08-01 | End: 2018-08-01

## 2018-08-01 RX ORDER — ATORVASTATIN CALCIUM 80 MG/1
80 TABLET, FILM COATED ORAL DAILY
Status: DISCONTINUED | OUTPATIENT
Start: 2018-08-01 | End: 2018-08-02 | Stop reason: HOSPADM

## 2018-08-01 RX ORDER — ACETAMINOPHEN 325 MG/1
650 TABLET ORAL EVERY 4 HOURS PRN
Status: DISCONTINUED | OUTPATIENT
Start: 2018-08-01 | End: 2018-08-02 | Stop reason: HOSPADM

## 2018-08-01 RX ORDER — GLIMEPIRIDE 2 MG/1
4 TABLET ORAL
Status: DISCONTINUED | OUTPATIENT
Start: 2018-08-02 | End: 2018-08-02 | Stop reason: HOSPADM

## 2018-08-01 RX ORDER — NICOTINE POLACRILEX 4 MG
15 LOZENGE BUCCAL PRN
Status: DISCONTINUED | OUTPATIENT
Start: 2018-08-01 | End: 2018-08-02 | Stop reason: HOSPADM

## 2018-08-01 RX ORDER — SODIUM CHLORIDE 0.9 % (FLUSH) 0.9 %
10 SYRINGE (ML) INJECTION EVERY 12 HOURS SCHEDULED
Status: DISCONTINUED | OUTPATIENT
Start: 2018-08-01 | End: 2018-08-02 | Stop reason: SDUPTHER

## 2018-08-01 RX ORDER — CALCIUM CARBONATE 200(500)MG
1 TABLET,CHEWABLE ORAL DAILY
Status: DISCONTINUED | OUTPATIENT
Start: 2018-08-01 | End: 2018-08-02 | Stop reason: HOSPADM

## 2018-08-01 RX ORDER — DEXTROSE MONOHYDRATE 50 MG/ML
100 INJECTION, SOLUTION INTRAVENOUS PRN
Status: DISCONTINUED | OUTPATIENT
Start: 2018-08-01 | End: 2018-08-02 | Stop reason: HOSPADM

## 2018-08-01 RX ORDER — ONDANSETRON 2 MG/ML
4 INJECTION INTRAMUSCULAR; INTRAVENOUS EVERY 6 HOURS PRN
Status: DISCONTINUED | OUTPATIENT
Start: 2018-08-01 | End: 2018-08-02 | Stop reason: HOSPADM

## 2018-08-01 RX ORDER — BUTALBITAL, ACETAMINOPHEN AND CAFFEINE 50; 325; 40 MG/1; MG/1; MG/1
1 TABLET ORAL EVERY 6 HOURS PRN
Status: DISCONTINUED | OUTPATIENT
Start: 2018-08-01 | End: 2018-08-01

## 2018-08-01 RX ORDER — DEXTROSE MONOHYDRATE 25 G/50ML
12.5 INJECTION, SOLUTION INTRAVENOUS PRN
Status: DISCONTINUED | OUTPATIENT
Start: 2018-08-01 | End: 2018-08-02 | Stop reason: HOSPADM

## 2018-08-01 RX ORDER — CETIRIZINE HYDROCHLORIDE 10 MG/1
10 TABLET ORAL DAILY
Status: DISCONTINUED | OUTPATIENT
Start: 2018-08-01 | End: 2018-08-02 | Stop reason: HOSPADM

## 2018-08-01 RX ORDER — SODIUM CHLORIDE 9 MG/ML
INJECTION, SOLUTION INTRAVENOUS CONTINUOUS
Status: DISCONTINUED | OUTPATIENT
Start: 2018-08-01 | End: 2018-08-02 | Stop reason: HOSPADM

## 2018-08-01 RX ORDER — LISINOPRIL AND HYDROCHLOROTHIAZIDE 20; 12.5 MG/1; MG/1
1 TABLET ORAL DAILY
Status: DISCONTINUED | OUTPATIENT
Start: 2018-08-01 | End: 2018-08-02 | Stop reason: HOSPADM

## 2018-08-01 RX ORDER — SODIUM CHLORIDE 0.9 % (FLUSH) 0.9 %
10 SYRINGE (ML) INJECTION EVERY 12 HOURS SCHEDULED
Status: DISCONTINUED | OUTPATIENT
Start: 2018-08-01 | End: 2018-08-02 | Stop reason: HOSPADM

## 2018-08-01 RX ADMIN — VERAPAMIL HYDROCHLORIDE 180 MG: 180 TABLET, FILM COATED, EXTENDED RELEASE ORAL at 21:58

## 2018-08-01 RX ADMIN — SODIUM CHLORIDE 200 MG: 9 INJECTION, SOLUTION INTRAVENOUS at 22:34

## 2018-08-01 RX ADMIN — BUTALBITAL, ACETAMINOPHEN, AND CAFFEINE 1 TABLET: 50; 325; 40 TABLET ORAL at 22:51

## 2018-08-01 RX ADMIN — SODIUM CHLORIDE: 9 INJECTION, SOLUTION INTRAVENOUS at 20:35

## 2018-08-01 RX ADMIN — Medication 10 ML: at 21:58

## 2018-08-01 ASSESSMENT — ENCOUNTER SYMPTOMS
SHORTNESS OF BREATH: 0
ABDOMINAL PAIN: 0
RHINORRHEA: 0

## 2018-08-01 ASSESSMENT — PAIN SCALES - GENERAL
PAINLEVEL_OUTOF10: 7
PAINLEVEL_OUTOF10: 0

## 2018-08-01 ASSESSMENT — PAIN DESCRIPTION - DESCRIPTORS: DESCRIPTORS: HEADACHE

## 2018-08-01 ASSESSMENT — PAIN DESCRIPTION - LOCATION: LOCATION: HEAD

## 2018-08-01 ASSESSMENT — PAIN DESCRIPTION - ONSET: ONSET: ON-GOING

## 2018-08-01 ASSESSMENT — PAIN DESCRIPTION - PAIN TYPE: TYPE: ACUTE PAIN

## 2018-08-01 ASSESSMENT — PAIN DESCRIPTION - FREQUENCY: FREQUENCY: CONTINUOUS

## 2018-08-01 NOTE — ED PROVIDER NOTES
[facility]  Emergency Department Encounter  Emergency Medicine Resident     Pt Name: Farrukh Eaton  MRN: [id number]  [place] 1955  Date of evaluation: 8/1/18  PCP:  Marion Miller MD    [address] [place] [address]       Chief Complaint   Patient presents with    Migraine    Numbness       HISTORY OF PRESENT ILLNESS  (Location/Symptom, Timing/Onset, Context/Setting, Quality, Duration, Modifying Factors, Severity, Associated signs/symptoms)     Farrukh Eaton is a 59 y.o. male who presents as a transfer from [facility] for stroke evaluation. Patient's last somewhat was 11 AM this morning. He presented stating that he had a headache as well as a blinding light light to the right eye and this weakness of his left upper and lower extremity. States that he has a history of contacts migraines and has presented similarly in the past.  Most recently in May approximately a year ago he had the same symptoms. He was given a migraine cocktail at the outlying facility with improvement of his symptoms of headache and visual deficit but still has persistent left upper and lower extremity weakness or numbness. Also he has decreased sensation on the right side of his face. States that prior episode had resolved after several hours and he was seen at [facility] for stroke workup which was negative. Currently denies any chest pain, shortness of breath, abdominal pain, nausea, vomiting. CT head done at [facility] facility was negative. He is allergic to contrast.    PAST MEDICAL / SURGICAL / SOCIAL / FAMILY HISTORY      has a past medical history of Acid reflux; Hyperlipidemia; Hypertension; Osteoarthritis; Type 2 diabetes mellitus without complication ([address] and Type II or unspecified type diabetes mellitus without mention of complication, not stated as uncontrolled. has a past surgical history that includes Appendectomy; cyst removal (Left, 1980); skin biopsy (Left); and Colonoscopy (10/19/15).     Social intracranial hemorrhage, or significant mass effect. Tiny focus of susceptibility signal within right basal ganglia appears new since previous examination in 2017. This likely represents a small area of calcification versus chronic microhemorrhage. No corresponding CT abnormality. EKG    Rhythm: normal sinus   Rate: normal  Axis: normal  Ectopy: none  Conduction: right bundle branch block   ST Segments: nonspecific changes  T Waves: non specific changes  Q Waves: none    Clinical Impression: non-specific EKG    Normal Interval Reference:  P-wave <110 ms  -200 ms  QRS <100 ms  QT <420 ms  QTc 330-470 ms    All EKG's are interpreted by the Emergency Department Physician who either signs or Co-signs this chart in the absence of a cardiologist.    EMERGENCY DEPARTMENT COURSE:    ED Course as of Aug 01 2020   Wed Aug 01, 2018   1600 Discussed with Dr. Arabella Soriano who states that he can be discharged if MRI is back and is normal and he is back to baseline. If he remains persistent symptoms he can be admitted to neurological service. [JN]      ED Course User Index  [JN] Danica Bravo MD       MDM: Imaging was discussed with Dr. Arabella Soriano. Patient remains symptomatic despite having improvement of headache. Given that he has persistent symptoms while admitted to neurology team for further evaluation. Discussed this patient is agreeable plan. Patient did note that he had some nodules on his left upper extremity and on evaluation there were scattered mobile nodules on his arm which she states when touched he felt a shooting sensation down his left arm. Discussed with patient that this can be further evaluated by neurology team while he is admitted. PROCEDURES:  None    CONSULTS:  IP CONSULT TO STROKE TEAM    FINAL IMPRESSION      1.  Left-sided weakness          DISPOSITION / PLAN     DISPOSITION Admitted 08/01/2018 06:05:07 PM      PATIENT REFERRED TO:  Mohamud Han MD  [facility] [address]  [phone]            DISCHARGE MEDICATIONS:  Current Discharge Medication List          Kayleigh Browne MD  Emergency Medicine Resident, PGY-2  [facility]    (Please note that portions of this note were completed with a voice recognition program.  Efforts were made to edit the dictations but occasionally words are mis-transcribed.)       Kayleigh Browne MD  Resident  08/01/18 2020

## 2018-08-01 NOTE — CARE COORDINATION
Case Management Initial Discharge Plan  Elizabet Sites,         Readmission Risk              Risk of Unplanned Readmission:        6               Met with:patient to discuss discharge plans. Information verified: address, contacts, phone number, , insurance Yes  PCP: Hugo Cuellar MD  Date of last visit:  Next appointment is 18    Insurance Provider: [employer]    Discharge Planning    Living Arrangements:      Support Systems:       Home has 1 stories  1 stairs to climb to get into front door. Patient able to perform ADL's:Independent    Current Services (outpatient & in home) none  DME equipment: glucometer. bp machine. Has crutches and a walker, that he does not use. DME provider:     Pharmacy: [facility] in [place]. Potential Assistance Purchasing Medications:     Does patient want to participate in local refill/ meds to beds program?       Potential Assistance Needed:       Patient agreeable to home care: no  [place] of choice provided:  n/a    Prior SNF/Rehab Placement and Facility:   Agreeable to SNF/Rehab: No  [place] of choice provided: n/a   Evaluation: no    Expected Discharge date:     Patient expects to be discharged to: Follow Up Appointment: Best Day/ Time:      Transportation provider: self  Transportation arrangements needed for discharge: No    Discharge Plan: return to home, no skilled needs identified at present time.         Electronically signed by Jo Ernst RN on 18 at 6:47 PM

## 2018-08-01 NOTE — H&P
Department of Neurological Sciences   History and Physical        CHIEF COMPLAINT: Headache, left-sided weakness    Reason for Admission: Left-sided weakness    History Obtained From:  patient, family member - Dr. Herlene Sacks:         The patient is a 59 y.o. male with significant past medical history of diabetes, hypertension, hyperlipidemia, hemiplegic migraines who presents with sudden onset of headache, left-sided weakness, right-sided vision changes that started around 11 AM today. He described the headache as sudden 10 out of 10, massive constant headache radiating from his neck to frontal area. He denied any aura. Headache was associated with photophobia, phonophobia. He was given midodrine cocktail at [facility] that help the headache. He got on his CT head at [facility] that did not find any acute CVA. He had a similar episode in May 2017. And he was diagnosed with complex migraines at that time. He was brought him [facility] ER where he got MRI brain done today was negative. He was not given any TPA as there was suspicion of recurrent complex migraine versus stroke. He says his headache and vision changes have subsided. Only left-sided weakness is still present. He denied any seizure-like activity. He will be admitted to neurology service for further evaluation and management of left-sided weakness. Echo in 2017 was consistent with EF of 50%. CT head in May 4, 2017 was negative  MRI brain May 4, 2017 was negative for any acute ischemia  MRI head/neck in May 4, 2017 was negative for any significant intracranial circulation abnormality. At artery Doppler May 4, 2017 showed minimal 1 to15 percent stenosis of ICA bilaterally patent vertebral arteries bilaterally with antegrade flow. His glucose was 178 today. He did not have any Electrolytes abnormalities.   His POC  troponin were 0.00  Past Medical History:        Diagnosis Date    Acid reflux    

## 2018-08-01 NOTE — CONSULTS
Endovascular Neurosurgery Note for  Transfer from [facility] and Stroke Alert @ 12:41  8/1/2018 5:47 PM  Pt Name: David Grover  MRN: [id number]  YOB: 1955  Date of evaluation: 8/1/2018  Primary Care Physician: Shan Longoria MD    David Grover is a 59 y.o. male who presents with recurrent symptoms of headache and left sided weakness last episode may 2017 with neg workup at that time for acute stroke. Patient was at work at noted, blinding light with subsequent left sided weakness. Head ct at [facility], no acute cva. Transferred to [facility] for further neurology exam. No iv tpa as suspect recurrent complex migraine vs stroke    Allergies  is allergic to dye [iodides]. Medications  Prior to Admission medications    Medication Sig Start Date End Date Taking?  Authorizing Provider   verapamil (CALAN SR) 120 MG extended release tablet Take 1 tablet by mouth daily 6/26/17  Yes Shan Longoria MD   SITagliptin (JANUVIA) 100 MG tablet Take 1 tablet by mouth daily 6/26/17  Yes Shan Longoria MD   metFORMIN (GLUCOPHAGE) 1000 MG tablet Take 1 tablet by mouth 2 times daily (with meals) 6/26/17  Yes Shan Longoria MD   glimepiride (AMARYL) 4 MG tablet Take 1 tablet by mouth every morning (before breakfast) 6/26/17  Yes Shan Longoria MD   fenofibrate 160 MG tablet Take 1 tablet by mouth daily 6/26/17  Yes Shan Longoria MD   atorvastatin (LIPITOR) 80 MG tablet Take 1 tablet by mouth daily 6/26/17  Yes Shan Longoria MD   calcium carbonate (TUMS) 500 MG chewable tablet Take 1 tablet by mouth daily   Yes Historical Provider, MD   bisacodyl (DULCOLAX) 5 MG EC tablet Take 5 mg by mouth daily as needed for Constipation   Yes Historical Provider, MD   loratadine (CLARITIN) 10 MG tablet Take 10 mg by mouth daily   Yes Historical Provider, MD   TRUETEST TEST strip TEST AS DIRECTED TWICE A DAY AND AS NEEDED 1/25/16  Yes CARLOS Diego   Blood Glucose Monitoring Suppl (TRUERESULT BLOOD GLUCOSE) W/DEVICE KIT use as directed 9/20/14  Yes Salma Cervantes   aspirin 81 MG tablet Take 81 mg by mouth daily. Yes Historical Provider, MD   Naproxen Sodium 220 MG CAPS Take 1 tablet by mouth daily. Yes Historical Provider, MD   lisinopril-hydrochlorothiazide (PRINZIDE;ZESTORETIC) 20-12.5 MG per tablet Take 1 tablet by mouth daily 6/26/17   Marion Miller MD   dapagliflozin (FARXIGA) 10 MG tablet Take 1 tablet by mouth every morning 6/26/17   Marion Miller MD   Simethicone 125 MG CAPS Take by mouth    Historical Provider, MD   Multiple Vitamins-Minerals (MULTIVITAL) TABS Take 1 tablet by mouth daily. Historical Provider, MD    Scheduled Meds:  Continuous Infusions:  PRN Meds:.  Past Medical History   has a past medical history of Acid reflux; Hyperlipidemia; Hypertension; Osteoarthritis; Type 2 diabetes mellitus without complication ([facility] [address] and Type II or unspecified type diabetes mellitus without mention of complication, not stated as uncontrolled. OBJECTIVE  BP (!) 149/79   Pulse 74   Temp 98.2 °F (36.8 °C)   Resp 23   Ht 5' 11\" (1.803 m)   Wt 185 lb (83.9 kg)   SpO2 98%   BMI 25.80 kg/m²   Gen: lying in bed, nad, doing well  Cv:RRR  NEURO: alert and oriented x 3 intact language, attention and knowledge  CN: eomi, perrl, v1-v3 intact no facial asymmetry, midline tongue  Motor 5/5/ rue/rle, 4/5 lue/lle  Gait able to ambulate from stretcher  snesory intact lt. Imaging:  Images were personally reviewed including:  CT brain without contrast: no hemorrhage  MRI brain, no acute cva    Assessment   59 y.o. male who presents with recurrent symptoms of headache and left sided weakness last episode may 2017 with neg workup at that time for acute stroke. 1. Suspect complex migraine      Recommendations:  1. Patient with persistent left hemiparesis - monitor overnight, neurology service  2.  Pt/ot, review medication list.  3. Further migraine management per neurology team.    80 min and greater than 50% time

## 2018-08-01 NOTE — ED NOTES
Pt arrives to the ED via Tx from [address] for complex migraine and numbness and tingling  Pt R [address] was at 1130 this morning  Pt was given a migraine cocktail at [place] plus 2 g of mag and migraine symptoms resolved  Pt still c/o numbness to the left side  Pt had similar symptoms last year and had an MRA w/out contrast  Pt was able to ambulate from EMS stretcher to the ED bed      Tavon Johnson RN  08/01/18 Ginger Hawley RN  08/01/18 [id number]

## 2018-08-02 ENCOUNTER — APPOINTMENT (OUTPATIENT)
Dept: MRI IMAGING | Age: 63
DRG: 103 | End: 2018-08-02
Payer: COMMERCIAL

## 2018-08-02 ENCOUNTER — APPOINTMENT (OUTPATIENT)
Dept: GENERAL RADIOLOGY | Age: 63
DRG: 103 | End: 2018-08-02
Payer: COMMERCIAL

## 2018-08-02 VITALS
RESPIRATION RATE: 18 BRPM | OXYGEN SATURATION: 96 % | WEIGHT: 185 LBS | SYSTOLIC BLOOD PRESSURE: 151 MMHG | HEART RATE: 82 BPM | DIASTOLIC BLOOD PRESSURE: 68 MMHG | TEMPERATURE: 97.5 F | HEIGHT: 71 IN | BODY MASS INDEX: 25.9 KG/M2

## 2018-08-02 LAB
ABSOLUTE EOS #: <0.03 K/UL (ref 0–0.44)
ABSOLUTE IMMATURE GRANULOCYTE: 0.07 K/UL (ref 0–0.3)
ABSOLUTE LYMPH #: 1.05 K/UL (ref 1.1–3.7)
ABSOLUTE MONO #: 0.09 K/UL (ref 0.1–1.2)
BASOPHILS # BLD: 0 % (ref 0–2)
BASOPHILS ABSOLUTE: 0.04 K/UL (ref 0–0.2)
CHOLESTEROL/HDL RATIO: 4.4
CHOLESTEROL: 198 MG/DL
DIFFERENTIAL TYPE: ABNORMAL
EKG ATRIAL RATE: 73 BPM
EKG P AXIS: 74 DEGREES
EKG P-R INTERVAL: 204 MS
EKG Q-T INTERVAL: 420 MS
EKG QRS DURATION: 146 MS
EKG QTC CALCULATION (BAZETT): 462 MS
EKG R AXIS: 107 DEGREES
EKG T AXIS: 8 DEGREES
EKG VENTRICULAR RATE: 73 BPM
EOSINOPHILS RELATIVE PERCENT: 0 % (ref 1–4)
ESTIMATED AVERAGE GLUCOSE: 203 MG/DL
GLUCOSE BLD-MCNC: 241 MG/DL (ref 75–110)
GLUCOSE BLD-MCNC: 271 MG/DL (ref 75–110)
HBA1C MFR BLD: 8.7 % (ref 4–6)
HCT VFR BLD CALC: 41.7 % (ref 40.7–50.3)
HDLC SERPL-MCNC: 45 MG/DL
HEMOGLOBIN: 13.5 G/DL (ref 13–17)
IMMATURE GRANULOCYTES: 1 %
LDL CHOLESTEROL: 135 MG/DL (ref 0–130)
LV EF: 55 %
LVEF MODALITY: NORMAL
LYMPHOCYTES # BLD: 8 % (ref 24–43)
MCH RBC QN AUTO: 26.6 PG (ref 25.2–33.5)
MCHC RBC AUTO-ENTMCNC: 32.4 G/DL (ref 28.4–34.8)
MCV RBC AUTO: 82.1 FL (ref 82.6–102.9)
MONOCYTES # BLD: 1 % (ref 3–12)
NRBC AUTOMATED: 0 PER 100 WBC
PDW BLD-RTO: 14.1 % (ref 11.8–14.4)
PLATELET # BLD: 308 K/UL (ref 138–453)
PLATELET ESTIMATE: ABNORMAL
PMV BLD AUTO: 10.4 FL (ref 8.1–13.5)
RBC # BLD: 5.08 M/UL (ref 4.21–5.77)
RBC # BLD: ABNORMAL 10*6/UL
SEG NEUTROPHILS: 90 % (ref 36–65)
SEGMENTED NEUTROPHILS ABSOLUTE COUNT: 11.62 K/UL (ref 1.5–8.1)
TRIGL SERPL-MCNC: 92 MG/DL
VLDLC SERPL CALC-MCNC: ABNORMAL MG/DL (ref 1–30)
WBC # BLD: 12.9 K/UL (ref 3.5–11.3)
WBC # BLD: ABNORMAL 10*3/UL

## 2018-08-02 PROCEDURE — 6370000000 HC RX 637 (ALT 250 FOR IP): Performed by: FAMILY MEDICINE

## 2018-08-02 PROCEDURE — 36415 COLL VENOUS BLD VENIPUNCTURE: CPT

## 2018-08-02 PROCEDURE — 82947 ASSAY GLUCOSE BLOOD QUANT: CPT

## 2018-08-02 PROCEDURE — 70549 MR ANGIOGRAPH NECK W/O&W/DYE: CPT

## 2018-08-02 PROCEDURE — 70544 MR ANGIOGRAPHY HEAD W/O DYE: CPT

## 2018-08-02 PROCEDURE — 6370000000 HC RX 637 (ALT 250 FOR IP): Performed by: EMERGENCY MEDICINE

## 2018-08-02 PROCEDURE — 6360000002 HC RX W HCPCS: Performed by: NURSE PRACTITIONER

## 2018-08-02 PROCEDURE — 71046 X-RAY EXAM CHEST 2 VIEWS: CPT

## 2018-08-02 PROCEDURE — 2580000003 HC RX 258: Performed by: FAMILY MEDICINE

## 2018-08-02 PROCEDURE — 2580000003 HC RX 258: Performed by: NURSE PRACTITIONER

## 2018-08-02 PROCEDURE — 93306 TTE W/DOPPLER COMPLETE: CPT

## 2018-08-02 PROCEDURE — 6360000004 HC RX CONTRAST MEDICATION: Performed by: NURSE PRACTITIONER

## 2018-08-02 PROCEDURE — 80061 LIPID PANEL: CPT

## 2018-08-02 PROCEDURE — 99238 HOSP IP/OBS DSCHRG MGMT 30/<: CPT | Performed by: PSYCHIATRY & NEUROLOGY

## 2018-08-02 PROCEDURE — A9576 INJ PROHANCE MULTIPACK: HCPCS | Performed by: NURSE PRACTITIONER

## 2018-08-02 PROCEDURE — 2580000003 HC RX 258: Performed by: EMERGENCY MEDICINE

## 2018-08-02 PROCEDURE — 85025 COMPLETE CBC W/AUTO DIFF WBC: CPT

## 2018-08-02 RX ORDER — SODIUM CHLORIDE 0.9 % (FLUSH) 0.9 %
10 SYRINGE (ML) INJECTION PRN
Status: DISCONTINUED | OUTPATIENT
Start: 2018-08-02 | End: 2018-08-02 | Stop reason: SDUPTHER

## 2018-08-02 RX ORDER — BUTALBITAL, ACETAMINOPHEN AND CAFFEINE 50; 325; 40 MG/1; MG/1; MG/1
1 TABLET ORAL EVERY 6 HOURS PRN
Qty: 60 TABLET | Refills: 0 | Status: ON HOLD | OUTPATIENT
Start: 2018-08-02 | End: 2018-09-12 | Stop reason: HOSPADM

## 2018-08-02 RX ADMIN — GADOTERIDOL 16 ML: 279.3 INJECTION, SOLUTION INTRAVENOUS at 10:38

## 2018-08-02 RX ADMIN — ATORVASTATIN CALCIUM 80 MG: 80 TABLET, FILM COATED ORAL at 08:54

## 2018-08-02 RX ADMIN — GLIMEPIRIDE 4 MG: 2 TABLET ORAL at 06:28

## 2018-08-02 RX ADMIN — LISINOPRIL AND HYDROCHLOROTHIAZIDE 1 TABLET: 12.5; 2 TABLET ORAL at 08:54

## 2018-08-02 RX ADMIN — CETIRIZINE HYDROCHLORIDE 10 MG: 10 TABLET ORAL at 08:54

## 2018-08-02 RX ADMIN — FENOFIBRATE 160 MG: 160 TABLET ORAL at 08:54

## 2018-08-02 RX ADMIN — ASPIRIN 81 MG: 81 TABLET, DELAYED RELEASE ORAL at 08:54

## 2018-08-02 RX ADMIN — SODIUM CHLORIDE, PRESERVATIVE FREE 10 ML: 5 INJECTION INTRAVENOUS at 08:57

## 2018-08-02 RX ADMIN — ANTACID TABLETS 500 MG: 500 TABLET, CHEWABLE ORAL at 08:54

## 2018-08-02 RX ADMIN — ACETAMINOPHEN 650 MG: 325 TABLET ORAL at 04:34

## 2018-08-02 RX ADMIN — Medication 10 ML: at 08:56

## 2018-08-02 RX ADMIN — SODIUM CHLORIDE 200 MG: 9 INJECTION, SOLUTION INTRAVENOUS at 04:35

## 2018-08-02 RX ADMIN — LINAGLIPTIN 5 MG: 5 TABLET, FILM COATED ORAL at 08:54

## 2018-08-02 ASSESSMENT — PAIN SCALES - GENERAL: PAINLEVEL_OUTOF10: 4

## 2018-08-02 NOTE — PROGRESS NOTES
[facility]  Occupational Therapy Not Seen Note    DATE: 2018  Name: Jade Navas  : 1955  MRN: [id number]    Patient not available for Occupational Therapy due to:    [] Testing:    [] Hemodialysis    [] Blood Transfusion in Progress    []Refusal by Patient:    [] Surgery/Procedure:    [] Strict Bedrest    [] Sedation    [] Spine Precautions     [] Pt being transferred to palliative care at this time. Spoke with pt/family and OT services to be defered. [x] Pt independent with functional mobility and functional tasks. Pt with no OT acute care needs at this time, will defer OT eval.    [] Other    Next Scheduled Treatment: N/A    Signature:  Vick Correa OTR/L

## 2018-08-02 NOTE — PLAN OF CARE
Problem: Falls - Risk of:  Goal: Will remain free from falls  Will remain free from falls  Outcome: Met This Shift  Fall precautions in place. Bed in lowest position, wheels locked, bed alarm in place and activated. Non-skid socks on patient. Fall risk ID on patient, call light within reach. Environment free of clutter and adequate lighting provided. Patient is encouraged to call out before getting out of bed and for any other needs. Patient remained free from falls during this shift. Will continue to monitor. Leonard Kerns RN

## 2018-08-02 NOTE — PROGRESS NOTES
Pt arrived to floor via stretcher from ED and ambulated to bed. Telemetry activated. Patient oriented to room and use of call light. Call light and personal items within reach. Admission and assessment initiated. POC and education initiated and reviewed with patient. Denied further needs or questions at this time. Will continue to monitor. Sterling Alcala RN

## 2018-08-02 NOTE — DISCHARGE SUMMARY
daily  Qty: 30 tablet, Refills: 5    Associated Diagnoses: Type 2 diabetes mellitus with hyperglycemia, unspecified long term insulin use status ([facility])      calcium carbonate (TUMS) 500 MG chewable tablet Take 1 tablet by mouth daily      bisacodyl (DULCOLAX) 5 MG EC tablet Take 5 mg by mouth daily as needed for Constipation      loratadine (CLARITIN) 10 MG tablet Take 10 mg by mouth daily      TRUETEST TEST strip TEST AS DIRECTED TWICE A DAY AND AS NEEDED  Qty: 50 strip, Refills: 11    Comments: Dx code E11.65  Associated Diagnoses: Type 2 diabetes mellitus with hyperglycemia ([facility])      Blood Glucose Monitoring Suppl (TRUERESULT BLOOD GLUCOSE) W/DEVICE KIT use as directed  Qty: 1 kit, Refills: 0    Comments: Dx. 250.00      aspirin 81 MG tablet Take 81 mg by mouth daily. Naproxen Sodium 220 MG CAPS Take 1 tablet by mouth daily. lisinopril-hydrochlorothiazide (PRINZIDE;ZESTORETIC) 20-12.5 MG per tablet Take 1 tablet by mouth daily  Qty: 30 tablet, Refills: 5    Associated Diagnoses: Essential hypertension      dapagliflozin (FARXIGA) 10 MG tablet Take 1 tablet by mouth every morning  Qty: 30 tablet, Refills: 5    Associated Diagnoses: Type 2 diabetes mellitus with hyperglycemia, unspecified long term insulin use status ([facility])      Simethicone 125 MG CAPS Take by mouth      Multiple Vitamins-Minerals (MULTIVITAL) TABS Take 1 tablet by mouth daily. Current Discharge Medication List            Consults:   none    Hospital Course:  58 yo wm with history of complicated migraine having to day abrupt onset of occipital headache with radiation to frontal head of throbbing component grade 10 over 10 with nausea followed thereon by some tremulousness with right eye blindness seeing only white light in that eye along with weakness of left arm and left leg with inability to move these limbs with numbness of left arm and left leg .  He was taken to [facility] . Head CT normal . Headache attenuated

## 2018-08-02 NOTE — PROGRESS NOTES
Smoking Cessation - topics covered   []  Health Risks  []  Benefits of Quitting   []  Smoking Cessation  []  Patient has no history of tobacco use  [x]  Patient is former smoker. Patient quit in 2013. [x]  No need for tobacco cessation education. []  Booklet given  []  Patient verbalizes understanding. []  Patient denies need for tobacco cessation education.   Alma Maldonado  8:07 AM

## 2018-08-02 NOTE — FLOWSHEET NOTE
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 He is in the hospital for tests related to migraines that paralyze him on the left side of his body. He has good support from his family including his daughter, son, brother and sister. He attends a [employer]. He was receptive to my visit and accepted my offer of prayer.

## 2018-08-02 NOTE — PROGRESS NOTES
Active Problem complicated migraine  . The condition is he is doing well with resolution of left arm and left side weakness with no numbness of left side with no focal motor ,sensory , bulbar or visual complaints . MRA of Head and neck are normal.  MRI of Head with only tiny right basal hemorrhage hemosiderin deposit otherwise negative . He has tolerated IV solumedrol well . Significant medications calan  mg po qhs , aspirin 81 mg po qd, lipitor 80 mg po qd, solumedrol 200 mg IVPB q 8 hours x 6  . Testing MRA of Head and neck are normal.  MRI of Head with only tiny right basal hemorrhage hemosiderin deposit otherwise negative  .  Cholesterol 198 , TG 92 ,       Past Medical History:   Diagnosis Date    Acid reflux     Hyperlipidemia     Hypertension     Osteoarthritis     Type 2 diabetes mellitus without complication (HCC)     Type II or unspecified type diabetes mellitus without mention of complication, not stated as uncontrolled        Past Surgical History:   Procedure Laterality Date    APPENDECTOMY      COLONOSCOPY  10/19/15    Dr. Judith Cantu CYST REMOVAL Left 1980    wrist    SKIN BIOPSY Left     one back       Family History   Problem Relation Age of Onset    High Blood Pressure Mother     Diabetes Mother     Heart Disease Mother     High Cholesterol Mother     Cancer Father         prostate    Cancer Brother         prostate    Stroke Brother     Other Paternal Grandfather         emphysmea    Diabetes Brother     High Cholesterol Brother     High Blood Pressure Brother     Diabetes Maternal Cousin        Social History     Social History    Marital status: Single     Spouse name: N/A    Number of children: N/A    Years of education: N/A     Social History Main Topics    Smoking status: Former Smoker     Years: 40.00     Types: Pipe, Cigars     Quit date: 12/15/2013    Smokeless tobacco: Never Used    Alcohol use 0.0 oz/week      Comment: social    Drug use: No    Sexual activity: No     Other Topics Concern    None     Social History Narrative    None       Current Facility-Administered Medications   Medication Dose Route Frequency Provider Last Rate Last Dose    sodium chloride flush 0.9 % injection 10 mL  10 mL Intravenous 2 times per day Oakley Hamman, MD   10 mL at 08/02/18 0857    sodium chloride flush 0.9 % injection 10 mL  10 mL Intravenous PRN Oakley Hamman, MD        acetaminophen (TYLENOL) tablet 650 mg  650 mg Oral Q4H PRN Oakley Hamman, MD   650 mg at 08/02/18 0434    lisinopril-hydrochlorothiazide (PRINZIDE;ZESTORETIC) 20-12.5 MG per tablet 1 tablet  1 tablet Oral Daily Seth Rivera MD   1 tablet at 08/02/18 0854    aspirin EC tablet 81 mg  81 mg Oral Daily Seth Rivera MD   81 mg at 08/02/18 0854    atorvastatin (LIPITOR) tablet 80 mg  80 mg Oral Daily Seth Rivera MD   80 mg at 08/02/18 0854    bisacodyl (DULCOLAX) EC tablet 5 mg  5 mg Oral Daily PRN Seth Rivera MD        calcium carbonate (TUMS) chewable tablet 500 mg  1 tablet Oral Daily Seth Rivera MD   500 mg at 08/02/18 0854    dapagliflozin (FARXIGA) tablet 10 mg  10 mg Oral QAM Seth Rivera MD        fenofibrate tablet 160 mg  160 mg Oral Daily Seth Rivera MD   160 mg at 08/02/18 0854    cetirizine (ZYRTEC) tablet 10 mg  10 mg Oral Daily Seth Rivera MD   10 mg at 08/02/18 0854    glimepiride (AMARYL) tablet 4 mg  4 mg Oral QAM AC Seth Rivera MD   4 mg at 08/02/18 7396    magnesium hydroxide (MILK OF MAGNESIA) 400 MG/5ML suspension 30 mL  30 mL Oral Daily PRN Seth Rivera MD        ondansetron (ZOFRAN) injection 4 mg  4 mg Intravenous Q6H PRN Seth Rivera MD        enoxaparin (LOVENOX) injection 40 mg  40 mg Subcutaneous Daily Seth Rivera MD        butalbital-acetaminophen-caffeine (FIORICET, ESGIC) per tablet 1 tablet  1 tablet Oral Q6H PRN Seth Rivera MD   1 tablet at 08/01/18 2251    insulin lispro (HUMALOG) injection vial 0-12 Units  0-12 Units Subcutaneous TID  Champ Jaramillo MD        insulin lispro (HUMALOG) injection vial 0-6 Units  0-6 Units Subcutaneous Nightly Champ Jaramillo MD        glucose (GLUTOSE) 40 % oral gel 15 g  15 g Oral PRN Champ Jaramillo MD        dextrose 50 % solution 12.5 g  12.5 g Intravenous PRN Champ Jaramillo MD        glucagon (rDNA) injection 1 mg  1 mg Intramuscular PRN Champ Jaramillo MD        dextrose 5 % solution  100 mL/hr Intravenous PRN Champ Jaramillo MD        0.9 % sodium chloride infusion   Intravenous Continuous Champ Jaramillo MD 75 mL/hr at 08/01/18 2035      methylPREDNISolone sodium (SOLU-MEDROL) 200 mg in sodium chloride 0.9 % 250 mL IVPB  200 mg Intravenous Q8H GILMER Marcum CNP   Stopped at 08/02/18 0505    verapamil (CALAN SR) extended release tablet 180 mg  180 mg Oral Nightly GILMER Marcum CNP   180 mg at 08/01/18 2158    linagliptin (TRADJENTA) tablet 5 mg  5 mg Oral Daily Champ Jaramillo MD   5 mg at 08/02/18 3592       Allergies   Allergen Reactions    Dye [Iodides] Hives       ROS:   Constitutional                  Negative for fever and chills   HEENT                            Negative for ear discharge, ear pain, nosebleed  Eyes                                Negative for photophobia, pain and discharge  Respiratory                      Negative for hemoptysis and sputum  Cardiovascular                Negative for orthopnea, claudication and PND  Gastrointestinal               Negative for abdominal pain, diarrhea, blood in stool  Musculoskeletal               Negative for joint pain, negative for myalgia  Skin                                 Negative for rash or itching  Endo/heme/allergies       Negative for polydipsia, environmental allergy  Psychiatric                       Negative for suicidal ideation.   Patient is not anxious    Vitals:    08/02/18 0745   BP: (!) 148/76   Pulse: 78   Resp: 18   Temp: 98.2 °F (36.8 °C)   SpO2: 95%     Admission weight: 185 lb (83.9 kg)    Neurological Examination  Constitutional .General exam well groomed   Head/ Ears /Nose/Throat/external ear . Normal exam  Neck and thyroid . Normal size. No bruits  Respiratory . Breathsounds clear bilaterally  Cardiovascular: Auscultation of heart with regular rate and rhythm   Musculoskeletal. Muscle bulk and tone normal                                                           Muscle strength 5/5 strength throughout                                                                                No dysmetria or dysdiadokinesis  No tremor   Normal fine motor  Orientation Alert and oriented x 3   Attention and concentration normal  Short term memory normal  Language process and speech normal . No aphasia   Cranial nerve 2 normal acuety and visual fields  Cranial nerve 3, 4 and 6 . Extraocular muscles are intact . Pupils are equal and reactive   Cranial nerve 5 . Intact corneal reflex. Normal facial sensation  Cranial nerve 7 normal exam   Cranial nerve 8. Grossly intact hearing   Cranial nerve 9 and 10. Symmetric palate elevation   Cranial nerve 11 , 5 out of 5 strength   Cranial Nerve 12 midline tongue . No atrophy  Sensation . Normal pinprick and light touch   Deep Tendon Reflexes normal  Plantar response flexor bilaterally    Assessment :    Complicated migraine with left side weakness resolved     Plan:    Calan  mg po qhs , fioricet q 8 hours PRN aspirin 81 mg po qd, lipitor 80 mg po qd.  FU in office in 4 weeks

## 2018-08-02 NOTE — PROGRESS NOTES
Physical Therapy  DATE: 2018    NAME: David Grover  MRN: [id number]   : 1955    Patient not seen this date for Physical Therapy due to:  [] Blood transfusion in progress  [] Hemodialysis  []  Patient Declined  [] Spine Precautions   [] Strict Bedrest  [] Surgery/ Procedure  [] Testing      [] Other        [x] PT being discontinued at this time. Patient independent. No further needs. [] PT being discontinued at this time as the patient has been transferred to palliative care. No further needs.     Asa Silence, PT

## 2018-08-03 LAB
ACTIVATED PROTEIN C RESISTANCE: 4.09
ANTICARDIOLIPIN IGA ANTIBODY: 1.9 APU
ANTICARDIOLIPIN IGG ANTIBODY: 4.5 GPU
AT-III ACTIVITY: 107 % (ref 83–122)
CARDIOLIPIN AB IGM: 3.9 MPU
DILUTE RUSSELL VIPER VENOM TIME: NORMAL
INR BLD: 0.9
LUPUS ANTICOAG: NORMAL
PARTIAL THROMBOPLASTIN TIME: 24.1 SEC (ref 20.5–30.5)
PROTEIN C ANTIGEN: 137 % (ref 63–153)
PROTEIN S ANTIGEN, TOTAL: 137 % (ref 84–134)
PROTHROMBIN TIME: 10 SEC (ref 9–12)

## 2018-08-06 ENCOUNTER — TELEPHONE (OUTPATIENT)
Dept: NEUROLOGY | Age: 63
End: 2018-08-06

## 2018-08-06 LAB
FACTOR V LEIDEN MUTATION: NORMAL
PROTHROMBIN G20210A MUTATION: NORMAL

## 2018-08-06 NOTE — TELEPHONE ENCOUNTER
Jodie called and left a message that since adding the Verapamil 180, ordered by Dr. Perez Moore, his blood pressure is dropping to low. He is on Lisinopril with HCTZ too. His message said that he contacted his PCP, but was told to call our office. A call was placed back to the patient. It went to voice mail. A message was left asking for a return call.

## 2018-09-11 ENCOUNTER — HOSPITAL ENCOUNTER (INPATIENT)
Age: 63
LOS: 1 days | Discharge: HOME | DRG: 103 | End: 2018-09-12
Attending: EMERGENCY MEDICINE | Admitting: PSYCHIATRY & NEUROLOGY
Payer: COMMERCIAL

## 2018-09-11 ENCOUNTER — APPOINTMENT (OUTPATIENT)
Dept: MRI IMAGING | Age: 63
DRG: 103 | End: 2018-09-11
Payer: COMMERCIAL

## 2018-09-11 DIAGNOSIS — G81.90 HEMIPARESIS, UNSPECIFIED HEMIPARESIS ETIOLOGY, UNSPECIFIED LATERALITY (HCC): Primary | ICD-10-CM

## 2018-09-11 LAB
% CKMB: 3.2 % (ref 0–3.5)
ABSOLUTE EOS #: <0.03 K/UL (ref 0–0.44)
ABSOLUTE IMMATURE GRANULOCYTE: 0.07 K/UL (ref 0–0.3)
ABSOLUTE LYMPH #: 0.9 K/UL (ref 1.1–3.7)
ABSOLUTE MONO #: 0.16 K/UL (ref 0.1–1.2)
ANION GAP SERPL CALCULATED.3IONS-SCNC: 14 MMOL/L (ref 9–17)
BASOPHILS # BLD: 0 % (ref 0–2)
BASOPHILS ABSOLUTE: <0.03 K/UL (ref 0–0.2)
BUN BLDV-MCNC: 19 MG/DL (ref 8–23)
BUN/CREAT BLD: ABNORMAL (ref 9–20)
CALCIUM SERPL-MCNC: 9.3 MG/DL (ref 8.6–10.4)
CHLORIDE BLD-SCNC: 101 MMOL/L (ref 98–107)
CK MB: 1.2 NG/ML
CKMB INTERPRETATION: ABNORMAL
CO2: 22 MMOL/L (ref 20–31)
CREAT SERPL-MCNC: 0.64 MG/DL (ref 0.7–1.2)
DIFFERENTIAL TYPE: ABNORMAL
EOSINOPHILS RELATIVE PERCENT: 0 % (ref 1–4)
GFR AFRICAN AMERICAN: >60 ML/MIN
GFR NON-AFRICAN AMERICAN: >60 ML/MIN
GFR SERPL CREATININE-BSD FRML MDRD: ABNORMAL ML/MIN/{1.73_M2}
GFR SERPL CREATININE-BSD FRML MDRD: ABNORMAL ML/MIN/{1.73_M2}
GLUCOSE BLD-MCNC: 194 MG/DL (ref 75–110)
GLUCOSE BLD-MCNC: 207 MG/DL (ref 70–99)
HCT VFR BLD CALC: 40.2 % (ref 40.7–50.3)
HEMOGLOBIN: 13.2 G/DL (ref 13–17)
IMMATURE GRANULOCYTES: 1 %
INR BLD: 1
LYMPHOCYTES # BLD: 11 % (ref 24–43)
MCH RBC QN AUTO: 27.4 PG (ref 25.2–33.5)
MCHC RBC AUTO-ENTMCNC: 32.8 G/DL (ref 28.4–34.8)
MCV RBC AUTO: 83.6 FL (ref 82.6–102.9)
MONOCYTES # BLD: 2 % (ref 3–12)
MYOGLOBIN: 24 NG/ML (ref 28–72)
NRBC AUTOMATED: 0 PER 100 WBC
PARTIAL THROMBOPLASTIN TIME: 23.5 SEC (ref 20.5–30.5)
PDW BLD-RTO: 14.3 % (ref 11.8–14.4)
PLATELET # BLD: 244 K/UL (ref 138–453)
PLATELET ESTIMATE: ABNORMAL
PMV BLD AUTO: 10.7 FL (ref 8.1–13.5)
POTASSIUM SERPL-SCNC: 4.3 MMOL/L (ref 3.7–5.3)
PROTHROMBIN TIME: 10.4 SEC (ref 9–12)
RBC # BLD: 4.81 M/UL (ref 4.21–5.77)
RBC # BLD: ABNORMAL 10*6/UL
SEG NEUTROPHILS: 86 % (ref 36–65)
SEGMENTED NEUTROPHILS ABSOLUTE COUNT: 7.36 K/UL (ref 1.5–8.1)
SODIUM BLD-SCNC: 137 MMOL/L (ref 135–144)
TOTAL CK: 38 U/L (ref 39–308)
TROPONIN INTERP: ABNORMAL
TROPONIN T: <0.03 NG/ML
WBC # BLD: 8.5 K/UL (ref 3.5–11.3)
WBC # BLD: ABNORMAL 10*3/UL

## 2018-09-11 PROCEDURE — 82553 CREATINE MB FRACTION: CPT

## 2018-09-11 PROCEDURE — 99284 EMERGENCY DEPT VISIT MOD MDM: CPT

## 2018-09-11 PROCEDURE — 85730 THROMBOPLASTIN TIME PARTIAL: CPT

## 2018-09-11 PROCEDURE — 2060000000 HC ICU INTERMEDIATE R&B

## 2018-09-11 PROCEDURE — 84484 ASSAY OF TROPONIN QUANT: CPT

## 2018-09-11 PROCEDURE — 85610 PROTHROMBIN TIME: CPT

## 2018-09-11 PROCEDURE — 80048 BASIC METABOLIC PNL TOTAL CA: CPT

## 2018-09-11 PROCEDURE — 2580000003 HC RX 258: Performed by: NURSE PRACTITIONER

## 2018-09-11 PROCEDURE — 83874 ASSAY OF MYOGLOBIN: CPT

## 2018-09-11 PROCEDURE — 6370000000 HC RX 637 (ALT 250 FOR IP): Performed by: NURSE PRACTITIONER

## 2018-09-11 PROCEDURE — 82947 ASSAY GLUCOSE BLOOD QUANT: CPT

## 2018-09-11 PROCEDURE — 85025 COMPLETE CBC W/AUTO DIFF WBC: CPT

## 2018-09-11 PROCEDURE — 70551 MRI BRAIN STEM W/O DYE: CPT

## 2018-09-11 PROCEDURE — 82550 ASSAY OF CK (CPK): CPT

## 2018-09-11 RX ORDER — DEXTROSE MONOHYDRATE 25 G/50ML
12.5 INJECTION, SOLUTION INTRAVENOUS PRN
Status: DISCONTINUED | OUTPATIENT
Start: 2018-09-11 | End: 2018-09-12 | Stop reason: HOSPADM

## 2018-09-11 RX ORDER — ASPIRIN 81 MG/1
81 TABLET ORAL DAILY
Status: DISCONTINUED | OUTPATIENT
Start: 2018-09-12 | End: 2018-09-12 | Stop reason: HOSPADM

## 2018-09-11 RX ORDER — ACETAMINOPHEN 325 MG/1
650 TABLET ORAL EVERY 4 HOURS PRN
Status: DISCONTINUED | OUTPATIENT
Start: 2018-09-11 | End: 2018-09-12 | Stop reason: HOSPADM

## 2018-09-11 RX ORDER — NICOTINE POLACRILEX 4 MG
15 LOZENGE BUCCAL PRN
Status: DISCONTINUED | OUTPATIENT
Start: 2018-09-11 | End: 2018-09-12 | Stop reason: HOSPADM

## 2018-09-11 RX ORDER — LISINOPRIL AND HYDROCHLOROTHIAZIDE 20; 12.5 MG/1; MG/1
1 TABLET ORAL DAILY
Status: DISCONTINUED | OUTPATIENT
Start: 2018-09-12 | End: 2018-09-12 | Stop reason: HOSPADM

## 2018-09-11 RX ORDER — ATORVASTATIN CALCIUM 80 MG/1
80 TABLET, FILM COATED ORAL DAILY
Status: DISCONTINUED | OUTPATIENT
Start: 2018-09-12 | End: 2018-09-12 | Stop reason: HOSPADM

## 2018-09-11 RX ORDER — DEXTROSE MONOHYDRATE 50 MG/ML
100 INJECTION, SOLUTION INTRAVENOUS PRN
Status: DISCONTINUED | OUTPATIENT
Start: 2018-09-11 | End: 2018-09-12 | Stop reason: HOSPADM

## 2018-09-11 RX ORDER — SODIUM CHLORIDE 0.9 % (FLUSH) 0.9 %
10 SYRINGE (ML) INJECTION PRN
Status: DISCONTINUED | OUTPATIENT
Start: 2018-09-11 | End: 2018-09-12 | Stop reason: HOSPADM

## 2018-09-11 RX ORDER — VERAPAMIL HYDROCHLORIDE 180 MG/1
180 CAPSULE, EXTENDED RELEASE ORAL NIGHTLY
Status: DISCONTINUED | OUTPATIENT
Start: 2018-09-11 | End: 2018-09-12 | Stop reason: HOSPADM

## 2018-09-11 RX ORDER — SODIUM CHLORIDE 0.9 % (FLUSH) 0.9 %
10 SYRINGE (ML) INJECTION EVERY 12 HOURS SCHEDULED
Status: DISCONTINUED | OUTPATIENT
Start: 2018-09-11 | End: 2018-09-12 | Stop reason: HOSPADM

## 2018-09-11 RX ORDER — ONDANSETRON 2 MG/ML
4 INJECTION INTRAMUSCULAR; INTRAVENOUS EVERY 6 HOURS PRN
Status: DISCONTINUED | OUTPATIENT
Start: 2018-09-11 | End: 2018-09-12 | Stop reason: HOSPADM

## 2018-09-11 RX ADMIN — VERAPAMIL HYDROCHLORIDE 180 MG: 180 CAPSULE, EXTENDED RELEASE ORAL at 23:26

## 2018-09-11 RX ADMIN — Medication 10 ML: at 23:29

## 2018-09-11 ASSESSMENT — ENCOUNTER SYMPTOMS
BLOOD IN STOOL: 0
WHEEZING: 0
CHEST TIGHTNESS: 0
VOMITING: 0
EYE REDNESS: 0
FACIAL SWELLING: 0
SORE THROAT: 0
NAUSEA: 0
COUGH: 0
PHOTOPHOBIA: 0
DIARRHEA: 0
SHORTNESS OF BREATH: 0
ABDOMINAL PAIN: 0

## 2018-09-11 NOTE — ED NOTES
Pt filling out MRI checklist, labs drawn, labelled and sent to lab.      Sherry Nissen, RN  09/11/18 2684

## 2018-09-11 NOTE — CONSULTS
Endovascular Neurosurgery Note  Stroke Alert paged @ [phone]  ER Room # 27  Arrival to patient bedside @   9/11/2018 4:36 PM    Pt Name: Judith Saravia  MRN: [id number]  YOB: 1955  Date of evaluation: 9/11/2018  Primary Care Physician: Lizbet Ramirez MD    Judith Saravia is a 59 y.o. male who presented as a transfer from [facility]. Patient was at [facility] today to have left groin lymph node removal and biopsy. Upon awakening from anesthesia, patient noted a frontal headache with right blurry vision. He then developed left sided numbness and weakness that progressed to left sided paralysis. He drank two cups of coffee and headache resolved. Symptoms of left sided weakness slowly began to improve prior to transfer. Patient reports similar symptoms August 1, 2018 and May 4, 2017, with a negative stroke workup at that time. Patient denies headache, vision changes, chest pain, shortness of breath, recent illness, nausea or vomiting. Allergies  is allergic to dye [iodides]. Medications  Prior to Admission medications    Medication Sig Start Date End Date Taking?  Authorizing Provider   butalbital-acetaminophen-caffeine (FIORICET, ESGIC) -14 MG per tablet Take 1 tablet by mouth every 6 hours as needed for Headaches 8/2/18 8/17/18  Sarah Kennedy MD   verapamil (CALAN SR) 180 MG extended release tablet Take 1 tablet by mouth nightly 8/2/18   Sarah Kennedy MD   SITagliptin (JANUVIA) 100 MG tablet Take 1 tablet by mouth daily 6/26/17   Valerie Bob MD   metFORMIN (GLUCOPHAGE) 1000 MG tablet Take 1 tablet by mouth 2 times daily (with meals) 6/26/17   Valerie Bob MD   glimepiride (AMARYL) 4 MG tablet Take 1 tablet by mouth every morning (before breakfast) 6/26/17   Valerie Bob MD   fenofibrate 160 MG tablet Take 1 tablet by mouth daily 6/26/17   Valerie Bob MD   atorvastatin (LIPITOR) 80 MG tablet Take 1 tablet by mouth daily 6/26/17   Valerie Bob MD lisinopril-hydrochlorothiazide (PRINZIDE;ZESTORETIC) 20-12.5 MG per tablet Take 1 tablet by mouth daily 6/26/17   Shan Longoria MD   dapagliflozin (FARXIGA) 10 MG tablet Take 1 tablet by mouth every morning 6/26/17   Shan Longoria MD   calcium carbonate (TUMS) 500 MG chewable tablet Take 1 tablet by mouth daily    Historical Provider, MD   Simethicone 125 MG CAPS Take by mouth    Historical Provider, MD   bisacodyl (DULCOLAX) 5 MG EC tablet Take 5 mg by mouth daily as needed for Constipation    Historical Provider, MD   loratadine (CLARITIN) 10 MG tablet Take 10 mg by mouth daily    Historical Provider, MD   TRUETEST TEST strip TEST AS DIRECTED TWICE A DAY AND AS NEEDED 1/25/16   CARLOS Diego   Blood Glucose Monitoring Suppl (TRUERESULT BLOOD GLUCOSE) W/DEVICE KIT use as directed 9/20/14   CARLOS Deigo   Multiple Vitamins-Minerals (MULTIVITAL) TABS Take 1 tablet by mouth daily. Historical Provider, MD   aspirin 81 MG tablet Take 81 mg by mouth daily. Historical Provider, MD   Naproxen Sodium 220 MG CAPS Take 1 tablet by mouth daily. Historical Provider, MD    Scheduled Meds:  Continuous Infusions:  PRN Meds:.    Past Medical History   has a past medical history of Acid reflux; Hyperlipidemia; Hypertension; Osteoarthritis; Type 2 diabetes mellitus without complication ([address] and Type II or unspecified type diabetes mellitus without mention of complication, not stated as uncontrolled.     OBJECTIVE  BP (!) 149/106   Pulse 85   Temp 97.5 °F (36.4 °C) (Oral)   Resp 27   Ht 5' 11\" (1.803 m)   Wt 185 lb (83.9 kg)   SpO2 95%   BMI 25.80 kg/m²     ROS  CONSTITUTIONAL: negative for fatigue and malaise   EYES: negative for double vision and photophobia    HEENT: negative for tinnitus and sore throat   RESPIRATORY: negative for cough, shortness of breath   CARDIOVASCULAR: negative for chest pain, palpitations   GASTROINTESTINAL: negative for nausea, vomiting   GENITOURINARY: negative for questions:  0 - performs both tasks correctly  2. Best Gaze:  0 - normal  3. Visual:  0 - no visual loss  4. Facial Palsy:  0 - normal symmetric movement  5a. Motor left arm:  1 - drift, limb holds 90 (or 45) degrees but drifts down before full 10 seconds: does not hit bed  5b. Motor right arm:  0 - no drift, limb holds 90 (or 45) degrees for full 10 seconds  6a. Motor left le - drift; leg falls by the end of the 5 second period but does not hit bed  6b. Motor right le - no drift; leg holds 30 degree position for full 5 seconds  7. Limb Ataxia:  0 - absent  8. Sensory:  1 - mild to moderate sensory loss; patient feels pinprick is less sharp or is dull on the affected side; there is a loss of superficial pain with pinprick but patient is aware of being touched   9. Best Language:  0 - no aphasia, normal  10. Dysarthria:  0 - normal  11. Extinction and Inattention:  0 - no abnormality    TOTAL:  3    Imaging:    MRI brain without contrast (limited stroke protocol): pending    Assessment  1. Last Known Well (date and time): 18 @ 1100 AM  2. Candidate for IV tPA therapy     Yes []     No  [x] due to the following exclusion criteria: left groin lymph node removal today  3. Candidate for Thrombectomy    Yes []      No [x] due to the following exclusion criteria: No evidence of LVO    Recommendations:  [x] General Care Status - prefer 5th floor (5A/5C)    MRI head without contrast - limited stroke evaluation  Recommend SBP less than 160  ASA 81 mg daily  Lipitor 80mg nightly  Physical Therapy  Occupational Therapy  Fasting Lipid panel: 18 Cholesterol 198/   Hgb A1c - 18 8.7  Ok for primary team to start anticoagulation as appropriate for DVT prophylaxis  NIHSS assessment every shift / change in caregiver.      Discussed with Dr. Argentina Munoz, APRN - CNP  Neuro Critical Care  Pager [facility]

## 2018-09-11 NOTE — ED PROVIDER NOTES
[facility]  Emergency Department Encounter  Emergency Medicine Resident     Pt Name: Antonia José  MRN: [id number]  [place] 1955  Date of evaluation: 9/11/18  PCP:  Shaun Reyes MD    [address] [place] [address]       Chief Complaint   Patient presents with    Numbness    Headache       HISTORY OF PRESENT ILLNESS  (Location/Symptom, Timing/Onset, Context/Setting, Quality, Duration, Modifying Factors, Severity.)      Antonia José is a 59 y.o. male who presents with Left-sided numbness and tingling. Patient is a transfer from [facility].  He was having a biopsy done of his of a lymph node in his left groin. He states he woke up from the anesthesia at around 11 AM and noticed he had numbness and tingling his left side and difficulty moving his left side. Patient received a CT scan was reportedly negative. He was transferred here for further evaluation and stat MRI. Patient does have a history of complex migraines and had a similar presentation with a migraine however denies any headache today. Despite what is written in chief complaint, patient denies any headache. PAST MEDICAL / SURGICAL / SOCIAL / FAMILY HISTORY      has a past medical history of Acid reflux; Hyperlipidemia; Hypertension; Osteoarthritis; Type 2 diabetes mellitus without complication ([address] and Type II or unspecified type diabetes mellitus without mention of complication, not stated as uncontrolled. has a past surgical history that includes Appendectomy; cyst removal (Left, 1980); skin biopsy (Left); and Colonoscopy (10/19/15). Social History     Social History    Marital status: Single     Spouse name: N/A    Number of children: N/A    Years of education: N/A     Occupational History    Not on file.      Social History Main Topics    Smoking status: Former Smoker     Years: 40.00     Types: Pipe, Cigars     Quit date: 12/15/2013    Smokeless tobacco: Never Used    Alcohol use 0.0 oz/week      Comment: social  Drug use: No    Sexual activity: No     Other Topics Concern    Not on file     Social History Narrative    No narrative on file       Family History   Problem Relation Age of Onset    High Blood Pressure Mother     Diabetes Mother     Heart Disease Mother     High Cholesterol Mother     Cancer Father         prostate    Cancer Brother         prostate    Stroke Brother     Other Paternal Grandfather         emphysmea    Diabetes Brother     High Cholesterol Brother     High Blood Pressure Brother     Diabetes Maternal Cousin        Allergies:  Dye [iodides]    Home Medications:  Prior to Admission medications    Medication Sig Start Date End Date Taking?  Authorizing Provider   butalbital-acetaminophen-caffeine (FIORICET, ESGIC) -01 MG per tablet Take 1 tablet by mouth every 6 hours as needed for Headaches 8/2/18 8/17/18  Seth Rivera MD   verapamil (CALAN SR) 180 MG extended release tablet Take 1 tablet by mouth nightly 8/2/18   Seth Rivera MD   SITagliptin (JANUVIA) 100 MG tablet Take 1 tablet by mouth daily 6/26/17   Anum Ojeda MD   metFORMIN (GLUCOPHAGE) 1000 MG tablet Take 1 tablet by mouth 2 times daily (with meals) 6/26/17   Anum Ojeda MD   glimepiride (AMARYL) 4 MG tablet Take 1 tablet by mouth every morning (before breakfast) 6/26/17   Anum Ojeda MD   fenofibrate 160 MG tablet Take 1 tablet by mouth daily 6/26/17   Anum Ojeda MD   atorvastatin (LIPITOR) 80 MG tablet Take 1 tablet by mouth daily 6/26/17   Anum Ojeda MD   lisinopril-hydrochlorothiazide (PRINZIDE;ZESTORETIC) 20-12.5 MG per tablet Take 1 tablet by mouth daily 6/26/17   Anum Ojeda MD   dapagliflozin (FARXIGA) 10 MG tablet Take 1 tablet by mouth every morning 6/26/17   Anum Ojeda MD   calcium carbonate (TUMS) 500 MG chewable tablet Take 1 tablet by mouth daily    Historical Provider, MD   Simethicone 125 MG CAPS Take by mouth    Historical Provider, MD   bisacodyl (DULCOLAX) 5 MG EC tablet

## 2018-09-11 NOTE — ED PROVIDER NOTES
DEFICITS. NO FACIAL DROOP OR WEAKNESS, BUT DECREASED SIMPLE TOUCH SENSATION IN LEFT FACE. NO CAROTID BRUITS. LUNGS CLEAR GT. CARDIAC-S1S2, RRR, NO MRG. ABD SOFT, NONDISTENDED, NONTENDER. NORMAL BOWEL SOUNDS. PATIENT HAD BEEN ACCEPTED FOR XFER BY DR. Lelo Schwarz WHO HAS EVALUATED PATIENT IN ED AND WILL ACCEPT PATIENT FOR ADMISSION TO HIS SERVICE FOR FURTHER EVALUATION. IMP-LEFT HEMIPARESIS/HEMISENSORY LOSS  PLAN-ADMIT, NEUROLOGY SERVICE TO CONTINUE EVALUATION.              Governor MD Keri  09/11/18 8605

## 2018-09-11 NOTE — ED NOTES
Bed: 27  Expected date:   Expected time:   Means of arrival:   Comments:  Krissy Kidd RN  09/11/18 1575

## 2018-09-11 NOTE — H&P
Neuro ICU History & Physical    Patient Name: Tram Blair  Patient : 1955  Room/Bed:   Allergies: Allergies   Allergen Reactions    Dye [Iodides] Hives     Problem List:   Patient Active Problem List   Diagnosis    Hyperlipidemia    Essential hypertension    Type 2 diabetes mellitus with hyperglycemia ([facility] [address]    Noncompliance with medication treatment due to underuse of medication    Cerebrovascular accident (CVA) ([facility] [address]    GERD (gastroesophageal reflux disease)    Dyslipidemia    Hypertriglyceridemia    Acute intractable headache    Left sided numbness    Migraine variant    Left-sided weakness    Complicated migraine       CHIEF COMPLAINT     Left sided weakness    HPI    History Obtained From: Patient and electronic medical record    Tram Blair is a 59 y.o. male who presented as a transfer from [facility]. Patient was at [facility] today to have left groin lymph node removal and biopsy. Upon awakening from anesthesia, patient noted a frontal headache with right blurry vision. He then developed left sided numbness and weakness that progressed to left sided paralysis. He drank two cups of coffee and headache resolved. Symptoms of left sided weakness slowly began to improve prior to transfer. Patient reports similar symptoms 2018 and May 4, 2017, with a negative stroke workup at that time. Patient denies headache, vision changes, chest pain, shortness of breath, recent illness, nausea or vomiting.     Admitted to Neuro stepdown From: ER        PATIENT HISTORY   Past Medical History:        Diagnosis Date    Acid reflux     Hyperlipidemia     Hypertension     Osteoarthritis     Type 2 diabetes mellitus without complication (HCC)     Type II or unspecified type diabetes mellitus without mention of complication, not stated as uncontrolled        Past Surgical History:        Procedure Laterality Date    APPENDECTOMY      COLONOSCOPY  10/19/15     SI/HI   SKIN Negative for spontaneous contusions, rashes, or lesions      PHYSICAL EXAM:     BP (!) 149/106   Pulse 85   Temp 97.5 °F (36.4 °C) (Oral)   Resp 27   Ht 5' 11\" (1.803 m)   Wt 185 lb (83.9 kg)   SpO2 95%   BMI 25.80 kg/m²     Estimated body mass index is 25.8 kg/m² as calculated from the following:    Height as of this encounter: 5' 11\" (1.803 m). Weight as of this encounter: 185 lb (83.9 kg).  []<16 Severe malnutrition  []1616.99 Moderate malnutrition  []1718.49 Mild malnutrition  []18.524.9 Normal  [x]2529.9 Overweight (not obese)  []3034.9 Obese class 1 (Low Risk)  []3539.9 Obese class 2 (Moderate Risk)  []?40 Obese class 3 (High Risk)    PHYSICAL EXAM:  CONSTITUTIONAL:  Well developed, well nourished, alert and oriented x 3, in no acute distress. GCS 15. Nontoxic. No dysarthria. No aphasia.    HEAD:  normocephalic, atraumatic    EYES:  PERRLA, EOMI.   ENT:  moist mucous membranes   LUNGS:  Equal air entry bilaterally   CARDIOVASCULAR:  normal s1 / s2   ABDOMEN:  Soft, no rigidity   NECK supple, symmetric   NEUROLOGIC:  Mental Status:  A & O x3,awake             Cranial Nerves:    cranial nerves II-XII are grossly intact    Motor Exam:    Drift:  present - left upper and left lower extremity  Tone:  normal    Motor exam is 5 out of 5 all extremities with the exception of 3 out of 5 strength left upper and left lower extremities    Sensory:    Touch:    Right Upper Extremity:  normal  Left Upper Extremity:  abnormal - decreased  Right Lower Extremity:  normal  Left Lower Extremity:  abnormal - decreased    Coordination/Dysmetria:  Heel to Shin:  Right:  normal  Left:  normal  Finger to Nose:   Right:  normal  Left:  normal    SKIN No obvious ecchymosis, rashes, or lesions      LABS AND IMAGING:     RECENT LABS:  CBC with Differential:    Lab Results   Component Value Date    WBC 8.5 09/11/2018    RBC 4.81 09/11/2018    HGB 13.2 09/11/2018    HCT 40.2 09/11/2018     09/11/2018 MCV 83.6 09/11/2018    MCH 27.4 09/11/2018    MCHC 32.8 09/11/2018    RDW 14.3 09/11/2018    LYMPHOPCT 11 09/11/2018    MONOPCT 2 09/11/2018    BASOPCT 0 09/11/2018    MONOSABS 0.16 09/11/2018    LYMPHSABS 0.90 09/11/2018    EOSABS <0.03 09/11/2018    BASOSABS <0.03 09/11/2018    DIFFTYPE NOT REPORTED 09/11/2018     BMP:    Lab Results   Component Value Date     09/11/2018    K 4.3 09/11/2018     09/11/2018    CO2 22 09/11/2018    BUN 19 09/11/2018    LABALBU 4.3 05/05/2017    CREATININE 0.64 09/11/2018    CALCIUM 9.3 09/11/2018    GFRAA >60 09/11/2018    LABGLOM >60 09/11/2018    GLUCOSE 207 09/11/2018       RADIOLOGY:   No results found. Labs and Images reviewed with:    [] Tammy Hill MD    [] Yumiko Montague MD    [] Tobias Abdullahi MD  [] Ara Rubalcava MD  X Dr. Shun Rios  --[] there are no new interval images to review. ASSESSMENT AND PLAN:       Patient Active Problem List   Diagnosis    Hyperlipidemia    Essential hypertension    Type 2 diabetes mellitus with hyperglycemia ([address]    Noncompliance with medication treatment due to underuse of medication    [facility] accident ([facility]A) ([facility] [address]    GERD (gastroesophageal reflux disease)    Dyslipidemia    Hypertriglyceridemia    Acute intractable headache    Left sided numbness    Migraine variant    Left-sided weakness    Complicated migraine       ASSESSMENT:     This is a 59 y.o. male with history of complex migraines, negative workup in 5/4/17 & 8/1/18. Acute onset frontal headache, left sided weakness, and left sided numbness. Patient care will be discussed with attending, will reevaluate patient along with attending.      PLAN/MEDICAL DECISION MAKING:    NEUROLOGIC:  - Follow up MRI brain  - Continue Aspirin 81 mg daily  - MRA head/neck negative 8/1/18  - Neuro checks per protocol    CARDIOVASCULAR:  - Normotension  - Lipid Panel: 8/2/18 Cholesterol 198 /   - Continue Lipitor 80 mg QHS  - 8/2/18 Echo: EF 55%  - Continue telemetry monitoring     PULMONARY:  - Maintaining oxygen saturations on room air  - Continue to monitor    RENAL/FLUID/ELECTROLYTE:  - BUN 19 /Creatinine 0.64  - Daily BMP  - Replace electrolytes prn    GI/NUTRITION:  - Diet:  Swallow study and advance diet as tolerated - Diabetic diet  - GI Prophylaxis: PO diet  - Bowel Regimen: Milk of Magnesia prn    ID/HEME:  - No leukocytosis, WBC 8.5  - Monitor for fevers  - H&H: 13.2/40.2  - Platelets 138  - Daily CBC    ENDOCRINE:  - monitor blood glucose  - insulin therapy -  Start low dose insulin sliding scale  - Hemoglobin A1C 8.2    OTHER:  - PT/OT eval     DVT PROPHYLAXIS:  - SCD sleeves - Thigh High   - CHANEL stockings - Thigh High  - Lovenox      DISPOSITION: Admit to Neuro stepdown      GILMER Rivera - CNP  Neuro Critical Care Service   Pager [phone]  9/11/2018     4:49 PM

## 2018-09-12 VITALS
RESPIRATION RATE: 14 BRPM | SYSTOLIC BLOOD PRESSURE: 166 MMHG | TEMPERATURE: 96.8 F | WEIGHT: 185 LBS | HEIGHT: 71 IN | HEART RATE: 68 BPM | OXYGEN SATURATION: 98 % | DIASTOLIC BLOOD PRESSURE: 65 MMHG | BODY MASS INDEX: 25.9 KG/M2

## 2018-09-12 PROBLEM — G81.90 HEMIPARESIS (HCC): Status: ACTIVE | Noted: 2018-08-01

## 2018-09-12 LAB
ABSOLUTE EOS #: <0.03 K/UL (ref 0–0.44)
ABSOLUTE IMMATURE GRANULOCYTE: 0.06 K/UL (ref 0–0.3)
ABSOLUTE LYMPH #: 1.72 K/UL (ref 1.1–3.7)
ABSOLUTE MONO #: 0.77 K/UL (ref 0.1–1.2)
ANION GAP SERPL CALCULATED.3IONS-SCNC: 15 MMOL/L (ref 9–17)
BASOPHILS # BLD: 0 % (ref 0–2)
BASOPHILS ABSOLUTE: 0.03 K/UL (ref 0–0.2)
BUN BLDV-MCNC: 20 MG/DL (ref 8–23)
BUN/CREAT BLD: ABNORMAL (ref 9–20)
CALCIUM SERPL-MCNC: 9.7 MG/DL (ref 8.6–10.4)
CHLORIDE BLD-SCNC: 99 MMOL/L (ref 98–107)
CO2: 22 MMOL/L (ref 20–31)
CREAT SERPL-MCNC: 0.74 MG/DL (ref 0.7–1.2)
DIFFERENTIAL TYPE: ABNORMAL
EOSINOPHILS RELATIVE PERCENT: 0 % (ref 1–4)
GFR AFRICAN AMERICAN: >60 ML/MIN
GFR NON-AFRICAN AMERICAN: >60 ML/MIN
GFR SERPL CREATININE-BSD FRML MDRD: ABNORMAL ML/MIN/{1.73_M2}
GFR SERPL CREATININE-BSD FRML MDRD: ABNORMAL ML/MIN/{1.73_M2}
GLUCOSE BLD-MCNC: 185 MG/DL (ref 75–110)
GLUCOSE BLD-MCNC: 210 MG/DL (ref 70–99)
HCT VFR BLD CALC: 41.7 % (ref 40.7–50.3)
HEMOGLOBIN: 13.4 G/DL (ref 13–17)
IMMATURE GRANULOCYTES: 1 %
LYMPHOCYTES # BLD: 13 % (ref 24–43)
MCH RBC QN AUTO: 26.5 PG (ref 25.2–33.5)
MCHC RBC AUTO-ENTMCNC: 32.1 G/DL (ref 28.4–34.8)
MCV RBC AUTO: 82.4 FL (ref 82.6–102.9)
MONOCYTES # BLD: 6 % (ref 3–12)
NRBC AUTOMATED: 0 PER 100 WBC
PDW BLD-RTO: 14.5 % (ref 11.8–14.4)
PLATELET # BLD: 295 K/UL (ref 138–453)
PLATELET ESTIMATE: ABNORMAL
PMV BLD AUTO: 10.2 FL (ref 8.1–13.5)
POTASSIUM SERPL-SCNC: 4.5 MMOL/L (ref 3.7–5.3)
RBC # BLD: 5.06 M/UL (ref 4.21–5.77)
RBC # BLD: ABNORMAL 10*6/UL
SEG NEUTROPHILS: 80 % (ref 36–65)
SEGMENTED NEUTROPHILS ABSOLUTE COUNT: 10.47 K/UL (ref 1.5–8.1)
SODIUM BLD-SCNC: 136 MMOL/L (ref 135–144)
WBC # BLD: 13.1 K/UL (ref 3.5–11.3)
WBC # BLD: ABNORMAL 10*3/UL

## 2018-09-12 PROCEDURE — 80048 BASIC METABOLIC PNL TOTAL CA: CPT

## 2018-09-12 PROCEDURE — 97162 PT EVAL MOD COMPLEX 30 MIN: CPT

## 2018-09-12 PROCEDURE — 2580000003 HC RX 258: Performed by: NURSE PRACTITIONER

## 2018-09-12 PROCEDURE — G8979 MOBILITY GOAL STATUS: HCPCS

## 2018-09-12 PROCEDURE — 97530 THERAPEUTIC ACTIVITIES: CPT

## 2018-09-12 PROCEDURE — G8978 MOBILITY CURRENT STATUS: HCPCS

## 2018-09-12 PROCEDURE — 99222 1ST HOSP IP/OBS MODERATE 55: CPT | Performed by: PSYCHIATRY & NEUROLOGY

## 2018-09-12 PROCEDURE — 6370000000 HC RX 637 (ALT 250 FOR IP): Performed by: NURSE PRACTITIONER

## 2018-09-12 PROCEDURE — 82947 ASSAY GLUCOSE BLOOD QUANT: CPT

## 2018-09-12 PROCEDURE — 94762 N-INVAS EAR/PLS OXIMTRY CONT: CPT

## 2018-09-12 PROCEDURE — 36415 COLL VENOUS BLD VENIPUNCTURE: CPT

## 2018-09-12 PROCEDURE — 85025 COMPLETE CBC W/AUTO DIFF WBC: CPT

## 2018-09-12 PROCEDURE — 99221 1ST HOSP IP/OBS SF/LOW 40: CPT | Performed by: PSYCHIATRY & NEUROLOGY

## 2018-09-12 RX ADMIN — ACETAMINOPHEN 650 MG: 325 TABLET ORAL at 11:50

## 2018-09-12 RX ADMIN — ATORVASTATIN CALCIUM 80 MG: 80 TABLET, FILM COATED ORAL at 08:33

## 2018-09-12 RX ADMIN — ACETAMINOPHEN 650 MG: 325 TABLET ORAL at 05:23

## 2018-09-12 RX ADMIN — Medication 10 ML: at 08:33

## 2018-09-12 ASSESSMENT — PAIN SCALES - GENERAL
PAINLEVEL_OUTOF10: 5
PAINLEVEL_OUTOF10: 4
PAINLEVEL_OUTOF10: 4

## 2018-09-12 ASSESSMENT — PAIN DESCRIPTION - FREQUENCY
FREQUENCY: INTERMITTENT
FREQUENCY: INTERMITTENT

## 2018-09-12 ASSESSMENT — PAIN DESCRIPTION - ORIENTATION
ORIENTATION: MID;LEFT
ORIENTATION: LEFT

## 2018-09-12 ASSESSMENT — PAIN DESCRIPTION - ONSET: ONSET: ON-GOING

## 2018-09-12 ASSESSMENT — PAIN DESCRIPTION - LOCATION
LOCATION: HEAD;GROIN
LOCATION: GROIN

## 2018-09-12 ASSESSMENT — PAIN DESCRIPTION - DESCRIPTORS
DESCRIPTORS: ACHING;DISCOMFORT;SORE
DESCRIPTORS: ACHING;HEADACHE

## 2018-09-12 ASSESSMENT — PAIN DESCRIPTION - PAIN TYPE
TYPE: ACUTE PAIN
TYPE: ACUTE PAIN

## 2018-09-12 NOTE — PROGRESS NOTES
Training  Safety Devices  Type of devices: Left in chair, Call light within reach, Nurse notified, Gait belt  Restraints  Initially in place: No    G-Code  PT G-Codes  Functional Limitation: Mobility: Walking and moving around  Mobility: Walking and Moving Around Current Status ():  At least 20 percent but less than 40 percent impaired, limited or restricted  Mobility: Walking and Moving Around Goal Status (): 0 percent impaired, limited or restricted                 AM-PAC Score     AM-PAC Inpatient Mobility without Stair Climbing Raw Score : 18  AM-PAC Inpatient without Stair Climbing T-Scale Score : 51.97  Mobility Inpatient CMS 0-100% Score: 23.26  Mobility Inpatient without Stair CMS G-Code Modifier : CJ       Goals  Short term goals  Time Frame for Short term goals: 14 visits  Short term goal 1: Perform bed mobility and functional transfers independently  Short term goal 2: Ascend/descend 4 steps with 1 HR and SBA  Short term goal 3: Ambulate 300ft without AD independently  Short term goal 4: Participate in 30 minutes of therapy to demo increased endurance  Short term goal 5: Demo Good- dynamic standing balance to decrease risk of falls       Therapy Time   Individual Concurrent Group Co-treatment   Time In 929         Time Out 947         Minutes 18         Timed Code Treatment Minutes: 12 Minutes       Ashok Black, PT

## 2018-09-12 NOTE — DISCHARGE SUMMARY
Physician Discharge Summary     Patient ID:  Antonia José  [id number]  89 y.o.  1955    Admit date: 9/11/2018    Discharge date and time: 9/12/2018    Admitting Physician: Chely Wei MD     Discharge Physician: Chely Wei MD    Admission Diagnoses: Complicated migraine [B28.465]    Discharge Diagnoses: Complicated Migraine [U89.575]    Admission Condition: fair    Discharged Condition: good    Indication for Admission: Stroke like event    Hospital Course: 59 y.o. male admitted on 9/11/2018 who presented as a transfer from [facility]. Patient was at [facility] today to have left groin lymph node removal and biopsy. Upon awakening from anesthesia, patient noted a frontal headache with right blurry vision. He took some coffee and his headache was gone but refers his headache was a 1-2 out of 10. 20 minutes after the headache he refers he then developed left sided numbness and weakness that progressed to left sided paralysis. His symptoms of left sided weakness slowly began to improve prior to transfer. Patient reports similar symptoms August 1, 2018 and May 4, 2017, with a negative stroke workup at that time. Patient denies headache, vision changes, chest pain, shortness of breath, recent illness, nausea or vomiting. Upon night stay his symptoms slowly improved. Better with strength and sensation. Still with some numbness. Consults: Neuroendovascular    Significant Diagnostic Studies:   Narrative   EXAMINATION:   MRI OF THE BRAIN WITHOUT CONTRAST  9/11/2018 5:14 pm       TECHNIQUE:   Multiplanar multisequence MRI of the brain was performed without the   administration of intravenous contrast.       COMPARISON:   August 1       HISTORY:   ORDERING SYSTEM PROVIDED HISTORY: stroke. left sided weakness       FINDINGS:   INTRACRANIAL STRUCTURES/VENTRICLES: Ventricles and sulci are mildly   prominent.  Ventricles are midline.  No new areas of abnormal increased T2 or   FLAIR signal are noted.  There is no acute hemorrhage.  No restricted   diffusion signal is noted.  Flow voids are patent.  A few perivascular spaces   are noted       ORBITS: The visualized portion of the orbits demonstrate no acute abnormality.       SINUSES: Minimal mastoid opacification is noted, right greater than left.       BONES/SOFT TISSUES: The bone marrow signal intensity appears normal. The soft   tissues demonstrate no acute abnormality.           Impression   No acute abnormality.  Specifically, there is no finding to suggest an acute   infarct.         Treatments: Continue his current medication    Discharge Exam:  GENERAL  Appears comfortable and in no distress   HEENT  NC/ AT   cardiovascular  S1 and S2 heard; palpation of pulses: radial pulse    NECK  Supple and no bruits heard   MENTAL STATUS:  Alert, oriented, intact memory, no confusion, normal speech, normal language, no hallucination or delusion   CRANIAL NERVES: II     -      Visual fields intact to confrontation  III,IV,VI -  PERR, EOMs full,   V     -     Decreased sensation in all left face (V1, V2, V3)  VII    -     Normal facial symmetry  VIII   -     Intact hearing but laouder in right side. Tyler showed lateralization to right side. Rinne's was normal  IX,X -     Symmetrical palate  XI    -     Symmetrical shoulder shrug  XII   -     Midline tongue, no atrophy    MOTOR FUNCTION: Significant for good strength of grade 5 out of 5 in right upper and lower extremities.  3+ out of 5 for left upper and lower extremities  Normal tone and no involuntary movements, no tremor   SENSORY FUNCTION:  Decreased sensation in left upper and lower extremity, refers some numbness   CEREBELLAR FUNCTION:  Intact fine motor control over upper limbs and lower limbs   REFLEX FUNCTION:  Symmetric, no Babinski sign   STATION and GAIT  Normal gait, normal tandem station, no Romberg      Disposition: home    In process/preliminary results:  Outstanding Order Results     No orders found 81 mg by mouth daily. Naproxen Sodium 220 MG CAPS Take 1 tablet by mouth daily. STOP taking these medications       butalbital-acetaminophen-caffeine (FIORICET, ESGIC) -40 MG per tablet Comments:   Reason for Stopping:         Multiple Vitamins-Minerals (MULTIVITAL) TABS Comments:   Reason for Stopping:             Activity: activity as tolerated  Diet: low fat, low cholesterol diet  Wound Care: none needed    Follow-up with General Neurology Dr. Nancy Patrick in 2-3 weeks.     Signed:  Juan Pimentel MD  Neurology Resident PGY-2  9/12/2018 at 1:41 PM

## 2018-09-12 NOTE — PROGRESS NOTES
NEUROLOGY INPATIENT PROGRESS NOTE    9/12/2018         Subjective: Farrukh Eaton is a  59 y.o. male admitted on 2/12/3725 with Complicated migraine [E56.121]    Briefly, this is a  59 y.o. male admitted on 9/11/2018 who presented as a transfer from [facility]. Patient was at [facility] today to have left groin lymph node removal and biopsy. Upon awakening from anesthesia, patient noted a frontal headache with right blurry vision. He took some coffee and his headache was gone but refers his headache was a 1-2 out of 10. 20 minutes after the headache he refers he then developed left sided numbness and weakness that progressed to left sided paralysis. His symptoms of left sided weakness slowly began to improve prior to transfer. Patient reports similar symptoms August 1, 2018 and May 4, 2017, with a negative stroke workup at that time. Patient denies headache, vision changes, chest pain, shortness of breath, recent illness, nausea or vomiting. Today (9/12/18) patient was evaluated at bedside and found AAOx3, NAD, afebrile. Patient refers his symptoms improved from yesterday but still with decreased sensation on left side of face and extremities. Weakness on upper and lower extremity but he refers is a lot better than when he got symptoms after biopsy. He was able to walk without assitance    No current facility-administered medications on file prior to encounter.       Current Outpatient Prescriptions on File Prior to Encounter   Medication Sig Dispense Refill    butalbital-acetaminophen-caffeine (FIORICET, ESGIC) -40 MG per tablet Take 1 tablet by mouth every 6 hours as needed for Headaches 60 tablet 0    verapamil (CALAN SR) 180 MG extended release tablet Take 1 tablet by mouth nightly 30 tablet 0    metFORMIN (GLUCOPHAGE) 1000 MG tablet Take 1 tablet by mouth 2 times daily (with meals) 60 tablet 5    glimepiride (AMARYL) 4 MG tablet Take 1 tablet by mouth every morning (before breakfast) LABA1C 8.7 (H) 08/01/2018    LABMICR 0 05/16/2015     IMAGING  1.) Brain MRI WO Contrast (9/11/2018)  Narrative   EXAMINATION:   MRI OF THE BRAIN WITHOUT CONTRAST  9/11/2018 5:14 pm       TECHNIQUE:   Multiplanar multisequence MRI of the brain was performed without the   administration of intravenous contrast.       COMPARISON:   August 1       HISTORY:   ORDERING SYSTEM PROVIDED HISTORY: stroke. left sided weakness       FINDINGS:   INTRACRANIAL STRUCTURES/VENTRICLES: Ventricles and sulci are mildly   prominent.  Ventricles are midline.  No new areas of abnormal increased T2 or   FLAIR signal are noted.  There is no acute hemorrhage.  No restricted   diffusion signal is noted.  Flow voids are patent.  A few perivascular spaces   are noted       ORBITS: The visualized portion of the orbits demonstrate no acute abnormality.       SINUSES: Minimal mastoid opacification is noted, right greater than left.       BONES/SOFT TISSUES: The bone marrow signal intensity appears normal. The soft   tissues demonstrate no acute abnormality.           Impression   No acute abnormality.  Specifically, there is no finding to suggest an acute   infarct.         2.) Brain MRI WO Contrast (8/3/2018)  Narrative   EXAMINATION:   MRI OF THE BRAIN WITHOUT CONTRAST  8/1/2018 5:09 pm       TECHNIQUE:   Multiplanar multisequence MRI of the brain was performed without the   administration of intravenous contrast.       COMPARISON:   May 4, 2017.  CT head on August 1, 2018.       HISTORY:   ORDERING SYSTEM PROVIDED HISTORY: CEREBRAL ISCHEMIA       FINDINGS:   INTRACRANIAL STRUCTURES/VENTRICLES:       There is no acute infarct.  Small focus of susceptibility signal within the   right basal ganglia appears new but likely represent calcification of chronic   microhemorrhage.  A few scattered T2 hyperintense white matter lesions.  No   mass effect or midline shift. The ventricles and sulci are normal in size and   configuration.  The sellar/suprasellar regions appear unremarkable.       ORBITS:       The visualized portion of the orbits demonstrate no acute abnormality.       SINUSES:       The visualized paranasal sinuses and mastoid air cells are well aerated.       BONES/SOFT TISSUES:       The bone marrow signal intensity appears normal. The soft tissues demonstrate   no acute abnormality.           Impression   1. No acute infarct, acute intracranial hemorrhage, or significant mass   effect.       2. Tiny focus of susceptibility signal within right basal ganglia appears new   since previous examination in 2017.  This likely represents small area of   calcification versus chronic microhemorrhage.  No corresponding CT   abnormality.         3.) Brain MRA H/N (8/2/18)  Narrative   EXAMINATION:   MRA OF THE NECK WITH AND WITHOUT CONTRAST 8/2/2018 10:39 am       TECHNIQUE:   Multiplanar multisequence MRA of the neck was performed with and without the   administration of intravenous contrast. Stenosis of the internal carotid   arteries measured using NASCET criteria.       COMPARISON:   None.       HISTORY:   ORDERING SYSTEM PROVIDED HISTORY: headache, left sided weakness       FINDINGS:   AORTIC ARCH/GREAT VESSELS: Detail of the aorta is limited due to artifact. Great vessel origins are patent.       CAROTID ARTERIES: Common carotid arteries, bifurcations and internal carotid   arteries are patent.  There is no focal significant narrowing       VERTEBRAL ARTERIES: Left vertebral artery is larger than the right.  Both are   patent without focal significant narrowing           Impression   No focal significant arterial narrowing in the neck           ASSESSMENT  Case of a 58 y/o male patient admitted for stroke like symptoms. Hx of complex migraines with same neurological presentation. MRI Brain with no intracranial abnormality, ischemia or bleeding. Patient refers his symptoms are improving. Most probably complicated migraine.  Will be discharge with follow up with [facility] Neurologist    PLAN  1.) Continue Aspirin 81mg PO D, Atorvastatin 80mg PO Nightly  2.) Will be discharge home  3.) Continue current medical treatment  4.) Follow up with general neurology with Dr. Nikita Martinez in 2-3 weeks    Ayanna Figueroa MD  Neurology Resident PGY-2  9/12/2018 at 9:02 AM

## 2018-09-12 NOTE — PROGRESS NOTES
Smoking Cessation - topics covered   []  Health Risks  []  Benefits of Quitting   []  Smoking Cessation  []  Patient has no history of tobacco use  [x]  Patient is former smoker. Patient quit in 2013. [x]  No need for tobacco cessation education. []  Booklet given  []  Patient verbalizes understanding. []  Patient denies need for tobacco cessation education.   Celina Vang  8:11 AM

## 2018-12-04 ENCOUNTER — OFFICE VISIT (OUTPATIENT)
Dept: INFECTIOUS DISEASES | Age: 63
End: 2018-12-04
Payer: MEDICARE

## 2018-12-04 VITALS
TEMPERATURE: 96.8 F | OXYGEN SATURATION: 93 % | SYSTOLIC BLOOD PRESSURE: 129 MMHG | HEART RATE: 77 BPM | RESPIRATION RATE: 16 BRPM | BODY MASS INDEX: 27.16 KG/M2 | HEIGHT: 71 IN | WEIGHT: 194 LBS | DIASTOLIC BLOOD PRESSURE: 74 MMHG

## 2018-12-04 DIAGNOSIS — R59.1 LYMPHADENOPATHY: Primary | ICD-10-CM

## 2018-12-04 PROBLEM — E11.9 DIABETES MELLITUS (HCC): Status: ACTIVE | Noted: 2018-10-04

## 2018-12-04 PROBLEM — R10.31 GROIN PAIN, CHRONIC, RIGHT: Status: ACTIVE | Noted: 2018-07-25

## 2018-12-04 PROBLEM — R91.8 MULTIPLE NODULES OF LUNG: Status: ACTIVE | Noted: 2018-10-04

## 2018-12-04 PROBLEM — N39.9 UROLOGIC DISORDERS: Status: ACTIVE | Noted: 2018-07-25

## 2018-12-04 PROBLEM — R00.1 BRADYCARDIA: Status: ACTIVE | Noted: 2018-09-27

## 2018-12-04 PROBLEM — G89.29 GROIN PAIN, CHRONIC, RIGHT: Status: ACTIVE | Noted: 2018-07-25

## 2018-12-04 PROBLEM — M54.9 NOTALGIA: Status: ACTIVE | Noted: 2018-07-25

## 2018-12-04 PROBLEM — N45.1 EPIDIDYMITIS: Status: ACTIVE | Noted: 2018-07-17

## 2018-12-04 PROBLEM — N50.819 ORCHIALGIA: Status: ACTIVE | Noted: 2018-07-25

## 2018-12-04 PROCEDURE — G8598 ASA/ANTIPLAT THER USED: HCPCS | Performed by: INTERNAL MEDICINE

## 2018-12-04 PROCEDURE — 99203 OFFICE O/P NEW LOW 30 MIN: CPT | Performed by: INTERNAL MEDICINE

## 2018-12-04 PROCEDURE — G8427 DOCREV CUR MEDS BY ELIG CLIN: HCPCS | Performed by: INTERNAL MEDICINE

## 2018-12-04 PROCEDURE — 1036F TOBACCO NON-USER: CPT | Performed by: INTERNAL MEDICINE

## 2018-12-04 PROCEDURE — G8484 FLU IMMUNIZE NO ADMIN: HCPCS | Performed by: INTERNAL MEDICINE

## 2018-12-04 PROCEDURE — 3017F COLORECTAL CA SCREEN DOC REV: CPT | Performed by: INTERNAL MEDICINE

## 2018-12-04 PROCEDURE — G8419 CALC BMI OUT NRM PARAM NOF/U: HCPCS | Performed by: INTERNAL MEDICINE

## 2018-12-04 ASSESSMENT — ENCOUNTER SYMPTOMS
GASTROINTESTINAL NEGATIVE: 1
RESPIRATORY NEGATIVE: 1

## 2018-12-04 NOTE — PROGRESS NOTES
Gastrointestinal: Negative. Genitourinary: Negative. Musculoskeletal: Negative. Neurological: Negative. Physical Examination :   /74 (Site: Left Upper Arm, Position: Sitting, Cuff Size: Medium Adult)   Pulse 77   Temp 96.8 °F (36 °C) (Oral)   Resp 16   Ht 5' 10.5\" (1.791 m)   Wt 194 lb (88 kg)   SpO2 93% Comment: room air at rest  BMI 27.44 kg/m²     Physical Exam   Constitutional: He is oriented to person, place, and time. He appears well-developed and well-nourished. HENT:   Head: Normocephalic and atraumatic. Neck: Normal range of motion. Neck supple. Cardiovascular: Normal rate and normal heart sounds. Exam reveals no gallop and no friction rub. Pulmonary/Chest: Effort normal and breath sounds normal. He has no wheezes. Abdominal: Soft. Bowel sounds are normal. He exhibits no mass. There is no tenderness. Musculoskeletal: Normal range of motion. He exhibits no edema. Lymphadenopathy:     He has no cervical adenopathy. Neurological: He is alert and oriented to person, place, and time. Skin: Skin is warm and dry.        Medical Decision Making:   I haveindependently reviewed the following labs:  CBC with Differential:  Lab Results   Component Value Date    WBC 13.1 09/12/2018    WBC 8.5 09/11/2018    HGB 13.4 09/12/2018    HGB 13.2 09/11/2018    HCT 41.7 09/12/2018    HCT 40.2 09/11/2018     09/12/2018     09/11/2018    LYMPHOPCT 13 09/12/2018    LYMPHOPCT 11 09/11/2018    MONOPCT 6 09/12/2018    MONOPCT 2 09/11/2018     BMP:  Lab Results   Component Value Date     09/12/2018     09/11/2018    K 4.5 09/12/2018    K 4.3 09/11/2018    CL 99 09/12/2018     09/11/2018    CO2 22 09/12/2018    CO2 22 09/11/2018    BUN 20 09/12/2018    BUN 19 09/11/2018    CREATININE 0.74 09/12/2018    CREATININE 0.64 09/11/2018     Hepatic Function Panel:   Lab Results   Component Value Date    PROT 7.8 05/05/2017    PROT 7.4 05/07/2016    LABALBU 4.3

## 2018-12-10 DIAGNOSIS — R59.1 LYMPHADENOPATHY: ICD-10-CM

## 2018-12-11 DIAGNOSIS — R59.1 LYMPHADENOPATHY: ICD-10-CM

## 2018-12-12 DIAGNOSIS — R59.1 LYMPHADENOPATHY: ICD-10-CM

## 2019-02-19 ENCOUNTER — OFFICE VISIT (OUTPATIENT)
Dept: CARDIOLOGY CLINIC | Age: 64
End: 2019-02-19
Payer: MEDICARE

## 2019-02-19 VITALS
SYSTOLIC BLOOD PRESSURE: 132 MMHG | HEART RATE: 54 BPM | DIASTOLIC BLOOD PRESSURE: 71 MMHG | HEIGHT: 71 IN | WEIGHT: 194 LBS | BODY MASS INDEX: 27.16 KG/M2

## 2019-02-19 DIAGNOSIS — I10 ESSENTIAL HYPERTENSION: Primary | Chronic | ICD-10-CM

## 2019-02-19 DIAGNOSIS — E78.5 DYSLIPIDEMIA: ICD-10-CM

## 2019-02-19 DIAGNOSIS — G43.809 MIGRAINE VARIANT: ICD-10-CM

## 2019-02-19 DIAGNOSIS — G81.90 HEMIPARESIS, UNSPECIFIED HEMIPARESIS ETIOLOGY, UNSPECIFIED LATERALITY (HCC): ICD-10-CM

## 2019-02-19 DIAGNOSIS — I63.89 CEREBROVASCULAR ACCIDENT (CVA) DUE TO OTHER MECHANISM (HCC): ICD-10-CM

## 2019-02-19 DIAGNOSIS — R00.1 BRADYCARDIA: ICD-10-CM

## 2019-02-19 DIAGNOSIS — E11.65 TYPE 2 DIABETES MELLITUS WITH HYPERGLYCEMIA, WITHOUT LONG-TERM CURRENT USE OF INSULIN (HCC): Chronic | ICD-10-CM

## 2019-02-19 DIAGNOSIS — E78.1 HYPERTRIGLYCERIDEMIA: ICD-10-CM

## 2019-02-19 DIAGNOSIS — Z91.14 NONCOMPLIANCE WITH MEDICATION TREATMENT DUE TO UNDERUSE OF MEDICATION: ICD-10-CM

## 2019-02-19 DIAGNOSIS — I48.0 PAROXYSMAL ATRIAL FIBRILLATION (HCC): ICD-10-CM

## 2019-02-19 DIAGNOSIS — E78.5 HYPERLIPIDEMIA, UNSPECIFIED HYPERLIPIDEMIA TYPE: ICD-10-CM

## 2019-02-19 PROCEDURE — 2022F DILAT RTA XM EVC RTNOPTHY: CPT | Performed by: INTERNAL MEDICINE

## 2019-02-19 PROCEDURE — G8484 FLU IMMUNIZE NO ADMIN: HCPCS | Performed by: INTERNAL MEDICINE

## 2019-02-19 PROCEDURE — G8598 ASA/ANTIPLAT THER USED: HCPCS | Performed by: INTERNAL MEDICINE

## 2019-02-19 PROCEDURE — 3046F HEMOGLOBIN A1C LEVEL >9.0%: CPT | Performed by: INTERNAL MEDICINE

## 2019-02-19 PROCEDURE — G8419 CALC BMI OUT NRM PARAM NOF/U: HCPCS | Performed by: INTERNAL MEDICINE

## 2019-02-19 PROCEDURE — 99213 OFFICE O/P EST LOW 20 MIN: CPT | Performed by: INTERNAL MEDICINE

## 2019-02-19 PROCEDURE — 1036F TOBACCO NON-USER: CPT | Performed by: INTERNAL MEDICINE

## 2019-02-19 PROCEDURE — 3017F COLORECTAL CA SCREEN DOC REV: CPT | Performed by: INTERNAL MEDICINE

## 2019-02-19 PROCEDURE — G8427 DOCREV CUR MEDS BY ELIG CLIN: HCPCS | Performed by: INTERNAL MEDICINE

## 2019-02-19 RX ORDER — AMIODARONE HYDROCHLORIDE 200 MG/1
200 TABLET ORAL 2 TIMES DAILY
COMMUNITY
End: 2019-02-19 | Stop reason: SDUPTHER

## 2019-02-19 RX ORDER — CHLORAL HYDRATE 500 MG
3000 CAPSULE ORAL 3 TIMES DAILY
COMMUNITY

## 2019-02-19 RX ORDER — AMIODARONE HYDROCHLORIDE 200 MG/1
200 TABLET ORAL DAILY
Qty: 90 TABLET | Refills: 3 | Status: SHIPPED | OUTPATIENT
Start: 2019-02-19 | End: 2020-02-14

## 2019-05-21 ENCOUNTER — OFFICE VISIT (OUTPATIENT)
Dept: CARDIOLOGY CLINIC | Age: 64
End: 2019-05-21
Payer: MEDICARE

## 2019-05-21 VITALS
WEIGHT: 190 LBS | DIASTOLIC BLOOD PRESSURE: 70 MMHG | SYSTOLIC BLOOD PRESSURE: 140 MMHG | BODY MASS INDEX: 26.88 KG/M2 | HEART RATE: 60 BPM

## 2019-05-21 DIAGNOSIS — R55 SYNCOPE, UNSPECIFIED SYNCOPE TYPE: ICD-10-CM

## 2019-05-21 DIAGNOSIS — I10 ESSENTIAL HYPERTENSION: ICD-10-CM

## 2019-05-21 DIAGNOSIS — E78.5 DYSLIPIDEMIA: ICD-10-CM

## 2019-05-21 DIAGNOSIS — E78.1 HYPERTRIGLYCERIDEMIA: ICD-10-CM

## 2019-05-21 DIAGNOSIS — I48.0 PAROXYSMAL ATRIAL FIBRILLATION (HCC): Primary | ICD-10-CM

## 2019-05-21 DIAGNOSIS — E11.9 TYPE 2 DIABETES MELLITUS WITHOUT COMPLICATION, WITHOUT LONG-TERM CURRENT USE OF INSULIN (HCC): ICD-10-CM

## 2019-05-21 PROCEDURE — G8419 CALC BMI OUT NRM PARAM NOF/U: HCPCS | Performed by: INTERNAL MEDICINE

## 2019-05-21 PROCEDURE — G8598 ASA/ANTIPLAT THER USED: HCPCS | Performed by: INTERNAL MEDICINE

## 2019-05-21 PROCEDURE — 2022F DILAT RTA XM EVC RTNOPTHY: CPT | Performed by: INTERNAL MEDICINE

## 2019-05-21 PROCEDURE — 3046F HEMOGLOBIN A1C LEVEL >9.0%: CPT | Performed by: INTERNAL MEDICINE

## 2019-05-21 PROCEDURE — G8427 DOCREV CUR MEDS BY ELIG CLIN: HCPCS | Performed by: INTERNAL MEDICINE

## 2019-05-21 PROCEDURE — 3017F COLORECTAL CA SCREEN DOC REV: CPT | Performed by: INTERNAL MEDICINE

## 2019-05-21 PROCEDURE — 99214 OFFICE O/P EST MOD 30 MIN: CPT | Performed by: INTERNAL MEDICINE

## 2019-05-21 PROCEDURE — 1036F TOBACCO NON-USER: CPT | Performed by: INTERNAL MEDICINE

## 2019-05-21 NOTE — PROGRESS NOTES
Cardiology Consultation  [facility]  [place], Herminia Gibson, Mike Del Cid)    05/21/19    Patient is here for afib and syncope    HPI and Chief Complaint:  Andre James  is doing well from a cardiac standpoint. Good functional capacity with no significant change in functional capacity. No chest pain, no dyspnea, no PND, no syncope or pre-syncope, no orthopnea. No symptoms of CHF or angina/chest pain. [facility]. Was noted to be in Afib at PCP office and sent over for Obs. A week earlier had syncope from low blood sugar. REVIEW OF SYSTEMS:    · Constitutional: there has been no unanticipated weight loss. There's been No change in energy level, No change in activity level. · Eyes: No visual changes or diplopia. No scleral icterus. · ENT: No Headaches, hearing loss or vertigo. No mouth sores or sore throat. · Cardiovascular: No chest pain, no dyspnea, no chf like symptoms  · Respiratory: No previous pulmonary problems  · Gastrointestinal: No abdominal pain, appetite loss, blood in stools. No change in bowel or bladder habits. · Genitourinary: No dysuria, trouble voiding, or hematuria. · Musculoskeletal:  No gait disturbance, No weakness or joint complaints. · Integumentary: No rash or pruritis. · Neurological: No headache, diplopia, change in muscle strength, numbness or tingling. No change in gait, balance, coordination, mood, affect, memory, mentation, behavior. · Psychiatric: No new anxiety or depression. · Endocrine: No temperature intolerance. No excessive thirst, fluid intake, or urination. No tremor. · Hematologic/Lymphatic: No abnormal bruising or bleeding, blood clots or swollen lymph nodes. · Allergic/Immunologic: No nasal congestion or hives.       Physical Exam:   Vitals: BP (!) 140/70   Pulse 60   Wt 190 lb (86.2 kg)   BMI 26.88 kg/m²   General appearance: alert and cooperative with exam  HEENT: Head: Normocephalic, no lesions, without obvious abnormality. Neck: no carotid bruit, no JVD  Lungs: clear to auscultation bilaterally  Heart: regular rate and rhythm, S1, S2 normal, no murmur, click, rub or gallop  Abdomen: soft, non-tender; bowel sounds normal; no masses,  no organomegaly  Extremities: extremities normal, atraumatic, no cyanosis or edema  Neurologic: Mental status: Alert, oriented, thought content appropriate      EKG: Normal Sinus Rhythm with no ischemic changes. LAST ECHO: 2/12/19  EF 55-60%  Mild MR    Holter 9/24/18  Rare PACs and PVCs  NSR with BBB    LAST STRESS:    LAST CATH:      Past Medical and Surgical History, Problem List, Allergies, Medications, Labs, Imaging, all reviewed extensively in EMR and with the patient. Assessment and Plan:    1. Afib- spontaneously converted. Echo reviewed. On amio, metop, eliquis. Doing well. Still in NSR. Feels well in SR. Migraines if he is in afib. 2. Migraines- resolved when Afib resolved. When he is in afib, he gets the headaches. 3. Syncope- was from too much insulin and low blood sugar  4. HTN- Chronic. Baljinder Presley well controlled at this time. Cont to optimize meds. 5. Obesity - Chronic. Encouraged diet, exercise, and discussed weight loss extensively. 6. Former Tobacco abuse. Chronic. Discussed extensively. 7. Hyperlipidemia- Chronic. As above. LDL goal < 70. Optimize therapy. 8. Hypertriglyceridemia- likely from poorly controlled type 2 DM  9. Type 2 Diabetes. Chronic. LDL goal < 70 and BP goal <130/80. Cont to optimize therapy. 10. CVA  11. Bradycardia- hx of this, resolved. Thank you for allowing me to participate in the care of this patient, please do not hesitate to call if you have any questions. Dexter Gaitan, [id number] [facility]  [facility]. [facility]  [phone]

## 2019-08-19 RX ORDER — APIXABAN 5 MG/1
TABLET, FILM COATED ORAL
Qty: 180 TABLET | Refills: 3 | Status: SHIPPED | OUTPATIENT
Start: 2019-08-19

## 2019-09-06 ENCOUNTER — TELEPHONE (OUTPATIENT)
Dept: CARDIOLOGY CLINIC | Age: 64
End: 2019-09-06

## 2019-09-06 DIAGNOSIS — I48.0 PAROXYSMAL ATRIAL FIBRILLATION (HCC): Primary | ICD-10-CM

## 2019-09-06 DIAGNOSIS — Z79.899 LONG TERM CURRENT USE OF AMIODARONE: ICD-10-CM

## 2019-10-10 NOTE — TELEPHONE ENCOUNTER
No voicemail when attempting to call pt. His cardio follow up is scheduled for 11/26/19. We will discuss further then, is the plan.

## 2019-12-20 ENCOUNTER — OFFICE VISIT (OUTPATIENT)
Dept: CARDIOLOGY CLINIC | Age: 64
End: 2019-12-20
Payer: MEDICARE

## 2019-12-20 VITALS
HEART RATE: 56 BPM | WEIGHT: 202 LBS | BODY MASS INDEX: 28.57 KG/M2 | DIASTOLIC BLOOD PRESSURE: 82 MMHG | SYSTOLIC BLOOD PRESSURE: 148 MMHG

## 2019-12-20 DIAGNOSIS — E78.2 MIXED HYPERLIPIDEMIA: ICD-10-CM

## 2019-12-20 DIAGNOSIS — E66.09 CLASS 1 OBESITY DUE TO EXCESS CALORIES WITH SERIOUS COMORBIDITY IN ADULT, UNSPECIFIED BMI: ICD-10-CM

## 2019-12-20 DIAGNOSIS — E11.9 CONTROLLED TYPE 2 DIABETES MELLITUS WITHOUT COMPLICATION, UNSPECIFIED WHETHER LONG TERM INSULIN USE (HCC): ICD-10-CM

## 2019-12-20 DIAGNOSIS — I10 HYPERTENSION, ESSENTIAL: ICD-10-CM

## 2019-12-20 DIAGNOSIS — I48.0 PAROXYSMAL A-FIB (HCC): Primary | ICD-10-CM

## 2019-12-20 DIAGNOSIS — G43.909 MIGRAINE WITHOUT STATUS MIGRAINOSUS, NOT INTRACTABLE, UNSPECIFIED MIGRAINE TYPE: ICD-10-CM

## 2019-12-20 PROCEDURE — G8598 ASA/ANTIPLAT THER USED: HCPCS | Performed by: INTERNAL MEDICINE

## 2019-12-20 PROCEDURE — G8419 CALC BMI OUT NRM PARAM NOF/U: HCPCS | Performed by: INTERNAL MEDICINE

## 2019-12-20 PROCEDURE — 2022F DILAT RTA XM EVC RTNOPTHY: CPT | Performed by: INTERNAL MEDICINE

## 2019-12-20 PROCEDURE — 1036F TOBACCO NON-USER: CPT | Performed by: INTERNAL MEDICINE

## 2019-12-20 PROCEDURE — 3017F COLORECTAL CA SCREEN DOC REV: CPT | Performed by: INTERNAL MEDICINE

## 2019-12-20 PROCEDURE — G8484 FLU IMMUNIZE NO ADMIN: HCPCS | Performed by: INTERNAL MEDICINE

## 2019-12-20 PROCEDURE — G8427 DOCREV CUR MEDS BY ELIG CLIN: HCPCS | Performed by: INTERNAL MEDICINE

## 2019-12-20 PROCEDURE — 3046F HEMOGLOBIN A1C LEVEL >9.0%: CPT | Performed by: INTERNAL MEDICINE

## 2019-12-20 PROCEDURE — 99214 OFFICE O/P EST MOD 30 MIN: CPT | Performed by: INTERNAL MEDICINE

## 2019-12-20 RX ORDER — OMEPRAZOLE 20 MG/1
20 CAPSULE, DELAYED RELEASE ORAL DAILY
COMMUNITY

## 2019-12-20 RX ORDER — LISINOPRIL 10 MG/1
10 TABLET ORAL DAILY
COMMUNITY

## 2020-02-14 RX ORDER — AMIODARONE HYDROCHLORIDE 200 MG/1
TABLET ORAL
Qty: 90 TABLET | Refills: 3 | Status: SHIPPED | OUTPATIENT
Start: 2020-02-14 | End: 2020-03-10

## 2020-03-06 ENCOUNTER — TELEPHONE (OUTPATIENT)
Dept: CARDIOLOGY CLINIC | Age: 65
End: 2020-03-06

## 2020-03-10 RX ORDER — AMIODARONE HYDROCHLORIDE 200 MG/1
TABLET ORAL
Qty: 90 TABLET | Refills: 3 | Status: SHIPPED | OUTPATIENT
Start: 2020-03-10

## 2020-03-25 PROBLEM — E78.5 HYPERLIPIDEMIA: Status: RESOLVED | Noted: 2020-03-25 | Resolved: 2020-03-24
